# Patient Record
Sex: FEMALE | Race: WHITE | NOT HISPANIC OR LATINO | Employment: UNEMPLOYED | URBAN - METROPOLITAN AREA
[De-identification: names, ages, dates, MRNs, and addresses within clinical notes are randomized per-mention and may not be internally consistent; named-entity substitution may affect disease eponyms.]

---

## 2018-05-14 ENCOUNTER — HISTORICAL (OUTPATIENT)
Dept: RADIOLOGY | Facility: HOSPITAL | Age: 58
End: 2018-05-14

## 2018-05-22 ENCOUNTER — HISTORICAL (OUTPATIENT)
Dept: ENDOSCOPY | Facility: HOSPITAL | Age: 58
End: 2018-05-22

## 2019-03-12 LAB
ABS NEUT (OLG): 3.19 X10(3)/MCL (ref 2.1–9.2)
ALBUMIN SERPL-MCNC: 3.7 GM/DL (ref 3.4–5)
ALBUMIN/GLOB SERPL: 1.2 {RATIO}
ALP SERPL-CCNC: 73 UNIT/L (ref 38–126)
ALT SERPL-CCNC: 16 UNIT/L (ref 12–78)
AST SERPL-CCNC: 8 UNIT/L (ref 15–37)
BASOPHILS # BLD AUTO: 0 X10(3)/MCL (ref 0–0.2)
BASOPHILS NFR BLD AUTO: 0 %
BILIRUB SERPL-MCNC: 0.6 MG/DL (ref 0.2–1)
BILIRUBIN DIRECT+TOT PNL SERPL-MCNC: 0.1 MG/DL (ref 0–0.2)
BILIRUBIN DIRECT+TOT PNL SERPL-MCNC: 0.5 MG/DL (ref 0–0.8)
BUN SERPL-MCNC: 16 MG/DL (ref 7–18)
CALCIUM SERPL-MCNC: 9 MG/DL (ref 8.5–10.1)
CHLORIDE SERPL-SCNC: 102 MMOL/L (ref 98–107)
CHOLEST SERPL-MCNC: 261 MG/DL (ref 0–200)
CHOLEST/HDLC SERPL: 4 {RATIO} (ref 0–4)
CO2 SERPL-SCNC: 32 MMOL/L (ref 21–32)
CREAT SERPL-MCNC: 0.92 MG/DL (ref 0.55–1.02)
EOSINOPHIL # BLD AUTO: 0.1 X10(3)/MCL (ref 0–0.9)
EOSINOPHIL NFR BLD AUTO: 2 %
ERYTHROCYTE [DISTWIDTH] IN BLOOD BY AUTOMATED COUNT: 12.5 % (ref 11.5–17)
GLOBULIN SER-MCNC: 3.2 GM/DL (ref 2.4–3.5)
GLUCOSE SERPL-MCNC: 80 MG/DL (ref 74–106)
HCT VFR BLD AUTO: 42.2 % (ref 37–47)
HDLC SERPL-MCNC: 65 MG/DL (ref 35–60)
HGB BLD-MCNC: 13.8 GM/DL (ref 12–16)
LDLC SERPL CALC-MCNC: 180 MG/DL (ref 0–129)
LYMPHOCYTES # BLD AUTO: 1.8 X10(3)/MCL (ref 0.6–4.6)
LYMPHOCYTES NFR BLD AUTO: 31 %
MCH RBC QN AUTO: 32.2 PG (ref 27–31)
MCHC RBC AUTO-ENTMCNC: 32.7 GM/DL (ref 33–36)
MCV RBC AUTO: 98.4 FL (ref 80–94)
MONOCYTES # BLD AUTO: 0.4 X10(3)/MCL (ref 0.1–1.3)
MONOCYTES NFR BLD AUTO: 8 %
NEUTROPHILS # BLD AUTO: 3.19 X10(3)/MCL (ref 2.1–9.2)
NEUTROPHILS NFR BLD AUTO: 57 %
PLATELET # BLD AUTO: 247 X10(3)/MCL (ref 130–400)
PMV BLD AUTO: 9.5 FL (ref 9.4–12.4)
POTASSIUM SERPL-SCNC: 4.4 MMOL/L (ref 3.5–5.1)
PROT SERPL-MCNC: 6.9 GM/DL (ref 6.4–8.2)
RBC # BLD AUTO: 4.29 X10(6)/MCL (ref 4.2–5.4)
SODIUM SERPL-SCNC: 137 MMOL/L (ref 136–145)
TRIGL SERPL-MCNC: 78 MG/DL (ref 30–150)
TSH SERPL-ACNC: 0.84 MIU/L (ref 0.36–3.74)
VLDLC SERPL CALC-MCNC: 16 MG/DL
WBC # SPEC AUTO: 5.6 X10(3)/MCL (ref 4.5–11.5)

## 2019-04-03 ENCOUNTER — HOSPITAL ENCOUNTER (OUTPATIENT)
Dept: MEDSURG UNIT | Facility: HOSPITAL | Age: 59
End: 2019-04-05
Attending: SURGERY | Admitting: SURGERY

## 2019-04-03 LAB
ABS NEUT (OLG): 12.55 X10(3)/MCL (ref 2.1–9.2)
BASOPHILS # BLD AUTO: 0 X10(3)/MCL (ref 0–0.2)
BASOPHILS NFR BLD AUTO: 0 %
BUN SERPL-MCNC: 7 MG/DL (ref 7–18)
CALCIUM SERPL-MCNC: 8.4 MG/DL (ref 8.5–10.1)
CHLORIDE SERPL-SCNC: 105 MMOL/L (ref 98–107)
CO2 SERPL-SCNC: 26 MMOL/L (ref 21–32)
CREAT SERPL-MCNC: 1.04 MG/DL (ref 0.55–1.02)
CREAT/UREA NIT SERPL: 6.7
ERYTHROCYTE [DISTWIDTH] IN BLOOD BY AUTOMATED COUNT: 12.5 % (ref 11.5–17)
GLUCOSE SERPL-MCNC: 127 MG/DL (ref 74–106)
GROUP & RH: NORMAL
HCT VFR BLD AUTO: 43.4 % (ref 37–47)
HGB BLD-MCNC: 13.8 GM/DL (ref 12–16)
LYMPHOCYTES # BLD AUTO: 0.6 X10(3)/MCL (ref 0.6–4.6)
LYMPHOCYTES NFR BLD AUTO: 5 %
MCH RBC QN AUTO: 32.2 PG (ref 27–31)
MCHC RBC AUTO-ENTMCNC: 31.8 GM/DL (ref 33–36)
MCV RBC AUTO: 101.4 FL (ref 80–94)
MONOCYTES # BLD AUTO: 0.3 X10(3)/MCL (ref 0.1–1.3)
MONOCYTES NFR BLD AUTO: 2 %
NEUTROPHILS # BLD AUTO: 12.55 X10(3)/MCL (ref 2.1–9.2)
NEUTROPHILS NFR BLD AUTO: 92 %
PLATELET # BLD AUTO: 212 X10(3)/MCL (ref 130–400)
PMV BLD AUTO: 9.4 FL (ref 9.4–12.4)
POTASSIUM SERPL-SCNC: 3.9 MMOL/L (ref 3.5–5.1)
RBC # BLD AUTO: 4.28 X10(6)/MCL (ref 4.2–5.4)
SODIUM SERPL-SCNC: 140 MMOL/L (ref 136–145)
WBC # SPEC AUTO: 13.6 X10(3)/MCL (ref 4.5–11.5)

## 2019-04-04 LAB
ABS NEUT (OLG): 7.26 X10(3)/MCL (ref 2.1–9.2)
BASOPHILS # BLD AUTO: 0 X10(3)/MCL (ref 0–0.2)
BASOPHILS NFR BLD AUTO: 0 %
BUN SERPL-MCNC: 8 MG/DL (ref 7–18)
CALCIUM SERPL-MCNC: 8.5 MG/DL (ref 8.5–10.1)
CHLORIDE SERPL-SCNC: 106 MMOL/L (ref 98–107)
CO2 SERPL-SCNC: 30 MMOL/L (ref 21–32)
CREAT SERPL-MCNC: 0.88 MG/DL (ref 0.55–1.02)
CREAT/UREA NIT SERPL: 9.1
EOSINOPHIL # BLD AUTO: 0 X10(3)/MCL (ref 0–0.9)
EOSINOPHIL NFR BLD AUTO: 0 %
ERYTHROCYTE [DISTWIDTH] IN BLOOD BY AUTOMATED COUNT: 12.4 % (ref 11.5–17)
GLUCOSE SERPL-MCNC: 126 MG/DL (ref 74–106)
HCT VFR BLD AUTO: 37.5 % (ref 37–47)
HGB BLD-MCNC: 12.3 GM/DL (ref 12–16)
LYMPHOCYTES # BLD AUTO: 1.5 X10(3)/MCL (ref 0.6–4.6)
LYMPHOCYTES NFR BLD AUTO: 16 %
MCH RBC QN AUTO: 32.6 PG (ref 27–31)
MCHC RBC AUTO-ENTMCNC: 32.8 GM/DL (ref 33–36)
MCV RBC AUTO: 99.5 FL (ref 80–94)
MONOCYTES # BLD AUTO: 0.8 X10(3)/MCL (ref 0.1–1.3)
MONOCYTES NFR BLD AUTO: 8 %
NEUTROPHILS # BLD AUTO: 7.26 X10(3)/MCL (ref 2.1–9.2)
NEUTROPHILS NFR BLD AUTO: 76 %
PLATELET # BLD AUTO: 219 X10(3)/MCL (ref 130–400)
PMV BLD AUTO: 9.3 FL (ref 9.4–12.4)
POTASSIUM SERPL-SCNC: 3.9 MMOL/L (ref 3.5–5.1)
RBC # BLD AUTO: 3.77 X10(6)/MCL (ref 4.2–5.4)
SODIUM SERPL-SCNC: 140 MMOL/L (ref 136–145)
WBC # SPEC AUTO: 9.6 X10(3)/MCL (ref 4.5–11.5)

## 2019-11-20 ENCOUNTER — HISTORICAL (OUTPATIENT)
Dept: RADIOLOGY | Facility: HOSPITAL | Age: 59
End: 2019-11-20

## 2020-01-22 ENCOUNTER — HISTORICAL (OUTPATIENT)
Dept: RADIOLOGY | Facility: HOSPITAL | Age: 60
End: 2020-01-22

## 2020-05-28 ENCOUNTER — HISTORICAL (OUTPATIENT)
Dept: ADMINISTRATIVE | Facility: HOSPITAL | Age: 60
End: 2020-05-28

## 2020-06-03 ENCOUNTER — HISTORICAL (OUTPATIENT)
Dept: HEPATOLOGY | Facility: HOSPITAL | Age: 60
End: 2020-06-03

## 2020-06-11 LAB
ABS NEUT (OLG): 3.94 X10(3)/MCL (ref 2.1–9.2)
BASOPHILS # BLD AUTO: 0.03 X10(3)/MCL (ref 0–0.2)
BASOPHILS NFR BLD AUTO: 0.4 % (ref 0–1)
BUN SERPL-MCNC: 19.4 MG/DL (ref 9.8–20.1)
CALCIUM SERPL-MCNC: 9.9 MG/DL (ref 8.4–10.2)
CHLORIDE SERPL-SCNC: 105 MMOL/L (ref 98–107)
CO2 SERPL-SCNC: 29 MMOL/L (ref 23–31)
CREAT SERPL-MCNC: 0.81 MG/DL (ref 0.57–1.11)
CREAT/UREA NIT SERPL: 24
EOSINOPHIL # BLD AUTO: 0.15 X10(3)/MCL (ref 0–0.9)
EOSINOPHIL NFR BLD AUTO: 2.2 % (ref 0–6.4)
ERYTHROCYTE [DISTWIDTH] IN BLOOD BY AUTOMATED COUNT: 12.6 % (ref 11.5–17)
GLUCOSE SERPL-MCNC: 91 MG/DL (ref 82–115)
HCT VFR BLD AUTO: 42 % (ref 37–47)
HGB BLD-MCNC: 13.9 GM/DL (ref 12–16)
IMM GRANULOCYTES # BLD AUTO: 0.01 10*3/UL (ref 0–0.02)
IMM GRANULOCYTES NFR BLD AUTO: 0.1 % (ref 0–0.43)
LYMPHOCYTES # BLD AUTO: 2.23 X10(3)/MCL (ref 0.6–4.6)
LYMPHOCYTES NFR BLD AUTO: 32.6 % (ref 16–44)
MCH RBC QN AUTO: 31.9 PG (ref 27–31)
MCHC RBC AUTO-ENTMCNC: 33.1 GM/DL (ref 33–36)
MCV RBC AUTO: 96.3 FL (ref 80–94)
MONOCYTES # BLD AUTO: 0.48 X10(3)/MCL (ref 0.1–1.3)
MONOCYTES NFR BLD AUTO: 7 % (ref 4–12.1)
NEUTROPHILS # BLD AUTO: 3.94 X10(3)/MCL (ref 2.1–9.2)
NEUTROPHILS NFR BLD AUTO: 57.7 % (ref 43–73)
NRBC BLD AUTO-RTO: 0 % (ref 0–0.2)
PLATELET # BLD AUTO: 257 X10(3)/MCL (ref 130–400)
PMV BLD AUTO: 9.3 FL (ref 7.4–10.4)
POTASSIUM SERPL-SCNC: 3.7 MMOL/L (ref 3.5–5.1)
RBC # BLD AUTO: 4.36 X10(6)/MCL (ref 4.2–5.4)
SODIUM SERPL-SCNC: 141 MMOL/L (ref 136–145)
WBC # SPEC AUTO: 6.8 X10(3)/MCL (ref 4.5–11.5)

## 2020-06-19 ENCOUNTER — HISTORICAL (OUTPATIENT)
Dept: SURGERY | Facility: HOSPITAL | Age: 60
End: 2020-06-19

## 2020-08-17 ENCOUNTER — HISTORICAL (OUTPATIENT)
Dept: ADMINISTRATIVE | Facility: HOSPITAL | Age: 60
End: 2020-08-17

## 2020-10-26 ENCOUNTER — HISTORICAL (OUTPATIENT)
Dept: RADIOLOGY | Facility: HOSPITAL | Age: 60
End: 2020-10-26

## 2021-03-25 ENCOUNTER — HISTORICAL (OUTPATIENT)
Dept: LAB | Facility: HOSPITAL | Age: 61
End: 2021-03-25

## 2021-03-25 LAB
ABS NEUT (OLG): 2.59 X10(3)/MCL (ref 2.1–9.2)
ALBUMIN SERPL-MCNC: 3.8 GM/DL (ref 3.4–4.8)
ALBUMIN/GLOB SERPL: 1.6 RATIO (ref 1.1–2)
ALP SERPL-CCNC: 82 UNIT/L (ref 40–150)
ALT SERPL-CCNC: 19 UNIT/L (ref 0–55)
AST SERPL-CCNC: 24 UNIT/L (ref 5–34)
BASOPHILS # BLD AUTO: 0 X10(3)/MCL (ref 0–0.2)
BASOPHILS NFR BLD AUTO: 0 %
BILIRUB SERPL-MCNC: 0.7 MG/DL
BILIRUBIN DIRECT+TOT PNL SERPL-MCNC: 0.3 MG/DL (ref 0–0.5)
BILIRUBIN DIRECT+TOT PNL SERPL-MCNC: 0.4 MG/DL (ref 0–0.8)
BUN SERPL-MCNC: 8.4 MG/DL (ref 9.8–20.1)
CALCIUM SERPL-MCNC: 8.6 MG/DL (ref 8.4–10.2)
CHLORIDE SERPL-SCNC: 104 MMOL/L (ref 98–107)
CO2 SERPL-SCNC: 29 MMOL/L (ref 23–31)
CREAT SERPL-MCNC: 0.88 MG/DL (ref 0.55–1.02)
EOSINOPHIL # BLD AUTO: 0.2 X10(3)/MCL (ref 0–0.9)
EOSINOPHIL NFR BLD AUTO: 2 %
ERYTHROCYTE [DISTWIDTH] IN BLOOD BY AUTOMATED COUNT: 11.7 % (ref 11.5–17)
GLOBULIN SER-MCNC: 2.4 GM/DL (ref 2.4–3.5)
GLUCOSE SERPL-MCNC: 84 MG/DL (ref 82–115)
HCT VFR BLD AUTO: 42.2 % (ref 37–47)
HGB BLD-MCNC: 13.9 GM/DL (ref 12–16)
LYMPHOCYTES # BLD AUTO: 2.7 X10(3)/MCL (ref 0.6–4.6)
LYMPHOCYTES NFR BLD AUTO: 45 %
MCH RBC QN AUTO: 32 PG (ref 27–31)
MCHC RBC AUTO-ENTMCNC: 32.9 GM/DL (ref 33–36)
MCV RBC AUTO: 97 FL (ref 80–94)
MONOCYTES # BLD AUTO: 0.5 X10(3)/MCL (ref 0.1–1.3)
MONOCYTES NFR BLD AUTO: 8 %
NEUTROPHILS # BLD AUTO: 2.59 X10(3)/MCL (ref 1.4–7.9)
NEUTROPHILS NFR BLD AUTO: 44 %
PLATELET # BLD AUTO: 233 X10(3)/MCL (ref 130–400)
PMV BLD AUTO: 9.2 FL (ref 9.4–12.4)
POTASSIUM SERPL-SCNC: 3.7 MMOL/L (ref 3.5–5.1)
PROT SERPL-MCNC: 6.2 GM/DL (ref 5.8–7.6)
RBC # BLD AUTO: 4.35 X10(6)/MCL (ref 4.2–5.4)
SODIUM SERPL-SCNC: 143 MMOL/L (ref 136–145)
TSH SERPL-ACNC: 0.82 UIU/ML (ref 0.35–4.94)
WBC # SPEC AUTO: 5.9 X10(3)/MCL (ref 4.5–11.5)

## 2021-07-21 ENCOUNTER — HISTORICAL (OUTPATIENT)
Dept: RADIOLOGY | Facility: HOSPITAL | Age: 61
End: 2021-07-21

## 2021-11-12 ENCOUNTER — HISTORICAL (OUTPATIENT)
Dept: RADIOLOGY | Facility: HOSPITAL | Age: 61
End: 2021-11-12

## 2021-12-01 ENCOUNTER — HISTORICAL (OUTPATIENT)
Dept: ADMINISTRATIVE | Facility: HOSPITAL | Age: 61
End: 2021-12-01

## 2021-12-27 ENCOUNTER — HISTORICAL (OUTPATIENT)
Dept: RADIOLOGY | Facility: HOSPITAL | Age: 61
End: 2021-12-27

## 2022-04-10 ENCOUNTER — HISTORICAL (OUTPATIENT)
Dept: ADMINISTRATIVE | Facility: HOSPITAL | Age: 62
End: 2022-04-10
Payer: MEDICARE

## 2022-04-11 ENCOUNTER — HISTORICAL (OUTPATIENT)
Dept: ADMINISTRATIVE | Facility: HOSPITAL | Age: 62
End: 2022-04-11
Payer: MEDICARE

## 2022-04-20 ENCOUNTER — HISTORICAL (OUTPATIENT)
Dept: PREADMISSION TESTING | Facility: HOSPITAL | Age: 62
End: 2022-04-20
Payer: MEDICARE

## 2022-04-20 LAB
ABS NEUT (OLG): 2.23 (ref 2.1–9.2)
ALBUMIN SERPL-MCNC: 3.7 G/DL (ref 3.4–4.8)
ALBUMIN/GLOB SERPL: 1.4 {RATIO} (ref 1.1–2)
ALP SERPL-CCNC: 102 U/L (ref 40–150)
ALT SERPL-CCNC: 19 U/L (ref 0–55)
AST SERPL-CCNC: 25 U/L (ref 5–34)
BASOPHILS # BLD AUTO: 0 10*3/UL (ref 0–0.2)
BASOPHILS NFR BLD AUTO: 1 %
BILIRUB SERPL-MCNC: 0.7 MG/DL
BILIRUBIN DIRECT+TOT PNL SERPL-MCNC: 0.3 (ref 0–0.5)
BILIRUBIN DIRECT+TOT PNL SERPL-MCNC: 0.4 (ref 0–0.8)
BUN SERPL-MCNC: 15.4 MG/DL (ref 9.8–20.1)
CALCIUM SERPL-MCNC: 9.2 MG/DL (ref 8.7–10.5)
CHLORIDE SERPL-SCNC: 104 MMOL/L (ref 98–107)
CO2 SERPL-SCNC: 31 MMOL/L (ref 23–31)
CREAT SERPL-MCNC: 0.96 MG/DL (ref 0.55–1.02)
DEPRECATED CALCIDIOL+CALCIFEROL SERPL-MC: 12.8 NG/ML (ref 30–80)
EOSINOPHIL # BLD AUTO: 0.2 10*3/UL (ref 0–0.9)
EOSINOPHIL NFR BLD AUTO: 4 %
ERYTHROCYTE [DISTWIDTH] IN BLOOD BY AUTOMATED COUNT: 12.5 % (ref 11.5–17)
GLOBULIN SER-MCNC: 2.6 G/DL (ref 2.4–3.5)
GLUCOSE SERPL-MCNC: 78 MG/DL (ref 82–115)
HCT VFR BLD AUTO: 43.3 % (ref 37–47)
HEMOLYSIS INTERF INDEX SERPL-ACNC: 7
HGB BLD-MCNC: 13.9 G/DL (ref 12–16)
ICTERIC INTERF INDEX SERPL-ACNC: 1
LIPEMIC INTERF INDEX SERPL-ACNC: <0
LYMPHOCYTES # BLD AUTO: 1.5 10*3/UL (ref 0.6–4.6)
LYMPHOCYTES NFR BLD AUTO: 35 %
MANUAL DIFF? (OHS): NO
MCH RBC QN AUTO: 31.9 PG (ref 27–31)
MCHC RBC AUTO-ENTMCNC: 32.1 G/DL (ref 33–36)
MCV RBC AUTO: 99.3 FL (ref 80–94)
MONOCYTES # BLD AUTO: 0.4 10*3/UL (ref 0.1–1.3)
MONOCYTES NFR BLD AUTO: 10 %
NEUTROPHILS # BLD AUTO: 2.23 10*3/UL (ref 2.1–9.2)
NEUTROPHILS NFR BLD AUTO: 51 %
PLATELET # BLD AUTO: 249 10*3/UL (ref 130–400)
PMV BLD AUTO: 10 FL (ref 9.4–12.4)
POTASSIUM SERPL-SCNC: 4.6 MMOL/L (ref 3.5–5.1)
PROT SERPL-MCNC: 6.3 G/DL (ref 5.8–7.6)
RBC # BLD AUTO: 4.36 10*6/UL (ref 4.2–5.4)
SODIUM SERPL-SCNC: 140 MMOL/L (ref 136–145)
TSH SERPL-ACNC: 4.98 M[IU]/L (ref 0.35–4.94)
WBC # SPEC AUTO: 4.4 10*3/UL (ref 4.5–11.5)

## 2022-04-26 ENCOUNTER — HOSPITAL ENCOUNTER (OUTPATIENT)
Dept: SURGERY | Facility: HOSPITAL | Age: 62
End: 2022-04-27
Attending: OBSTETRICS & GYNECOLOGY | Admitting: OBSTETRICS & GYNECOLOGY

## 2022-04-27 LAB
HCT VFR BLD AUTO: 38.6 % (ref 37–47)
HGB BLD-MCNC: 12.4 G/DL (ref 12–16)

## 2022-04-28 VITALS
WEIGHT: 174 LBS | SYSTOLIC BLOOD PRESSURE: 121 MMHG | DIASTOLIC BLOOD PRESSURE: 77 MMHG | HEIGHT: 65 IN | BODY MASS INDEX: 28.99 KG/M2

## 2022-04-28 VITALS
SYSTOLIC BLOOD PRESSURE: 121 MMHG | WEIGHT: 174 LBS | DIASTOLIC BLOOD PRESSURE: 77 MMHG | HEIGHT: 65 IN | BODY MASS INDEX: 28.99 KG/M2

## 2022-04-30 NOTE — OP NOTE
DATE OF SURGERY:    06/19/2020    SURGEON:  Sanjiv Hendrickson MD    SERVICE:  Orthopedics.    PREOPERATIVE DIAGNOSES:    1. Right shoulder rotator cuff tear.    2. Bicipital tendon tearing.  3. Subacromial impingement.  4. SLAP tear.    POSTOPERATIVE DIAGNOSES:    1. Right shoulder rotator cuff tear.    2. Bicipital tendon tearing.  3. Subacromial impingement.  4. SLAP tear.    PROCEDURES PERFORMED:    1. Right shoulder arthroscopy with debridement of anterior superior labrum anterior and posterior tear.  2. Mini open rotator cuff repair, full thickness and retracted.  3. Mini open right shoulder biceps tenodesis.  4. Right shoulder mini open subacromial decompression.    ANESTHESIA:  LMA.    IV FLUIDS:  As per Anesthesia.    ESTIMATED BLOOD LOSS:  Less than 10 cc.    COUNTS:  Correct.    COMPLICATIONS:  None.    IMPLANTS:  Arthrex suture anchors x2.    DISPOSITION:  Stable to PACU.    INDICATIONS FOR THE PROCEDURE:  Ms Beck is a 60-year-old female with continued pain and loss of motion of her right shoulder and arm.  She has failed multiple conservative treatments.  She has been followed in my clinic.  She had an MRI demonstrating the above findings.  The risks, benefits, and alternatives were discussed with the patient in detail.  All questions were answered.  Informed consent was obtained.    OPERATIVE NOTE IN DETAIL:  The patient was found in preoperative holding by Anesthesia and found fit for surgery.  She was taken to the operating room and placed on the operating table in supine position.  All bony prominences were well padded.  Time-out was called to identify correct patient, correct procedure, correct site, and all were in agreement.  The patient underwent LMA anesthesia without complications.  She was then prepped and draped in usual sterile fashion, leaving the right upper extremity exposed for surgery.  She was in the modified beach chair position.  She received her preoperative antibiotics.  Next,  through the posterior portal with good backflow, a 1 cm incision was made.  The camera was introduced into the glenohumeral joint.  After this was done, the anterior portal was then made through 1 cm incision just lateral to the tip of the coracoid.  Next, examination of the glenohumeral joint demonstrated some minimal degenerative changes.  She had biceps tendon tearing, intra-articular, as well as complete subluxation medially.  She had a large subscap retracted rotator cuff tear as well as a supraspinatus rotator cuff tear.  It was full thickness and retracted to about the mid humeral line.  With the use of a 3.5 shaver, the patient underwent debridement of the anterior labral tear as well.  The inferior recess was inspected and found no loose bodies.  The posterior labrum was intact.  Given the degenerative changes of the biceps tendon as well as medial subluxation, a 3.5 cm anterolateral incision was made over the right shoulder.  Soft tissue dissection was taken down to the fascia, where it was split in line with its fibers, careful not to go greater than 3 cm past the tip of the acromion.  Next, the subacromial space was identified.  A large amount of bursal tissue was removed.  She had scraping of the undersurface of the acromion.  After subacromial decompression, attention was turned to the biceps first, where it was then tagged and cut.  It was then placed into the intra-articular groove.  The patient had a biceps tenodesis with the Arthrex suture anchor.  Care was taken not to over or under tension the biceps tendon.  After this was done, attention was turned to the full-thickness supraspinatus rotator cuff tear.  With the use of an Arthrex suture anchor, it was then repaired back down to the insertion area of the footprint.  She had good coverage over the humeral head.  Attention was turned to the subscap, where it was completely retracted.  It was unable to be brought back over to the insertion area.   After this was done, the camera was introduced back into the glenohumeral joint where the remaining stump was removed with a 3.5 shaver.  After this was done, copious irrigation was used to wash the incisions.  The incisions were then closed.  The fascia was closed with 0 Vicryl.  The subcutaneous tissue was closed with 2-0 Vicryl.  The skin was closed with 3-0 Monocryl sutures.  Mastisol, Steri-Strips, Xeroform, 4 x 4's, soft tissue dressing, and a shoulder abduction     sling were placed to the right upper extremity.  She was then awoken by Anesthesia and brought to PACU in stable condition.        ______________________________  MD ARIK Romero/SK  DD:  06/23/2020  Time:  12:54PM  DT:  06/23/2020  Time:  02:02PM  Job #:  [id number]

## 2022-04-30 NOTE — OP NOTE
Patient:   Teri Beck            MRN: [id number]            FIN: [id number]               Age:   58 years     Sex:  Female     :  1960   Associated Diagnoses:   None   Author:   Afshin Navarro MD      procedure: esophagal Manometry  Physician: Dr. Hans Navarro    Indication: gerd, hiatal hernia, epigastric pain    Findings: 10 wet swallows were performed.   The LES pressure was normal.  There were a few failed swallows.  There were a few swallows with weak peristalsis.  DCI was normal.      Impression:   1. LArge hiatal hernia  2.  Frequent failed peristalsis  3.  a few weak swallows    Plan:    1.  pt should be ok for nissen repair.  Would not perform tight Nissen Wrap if possible.

## 2022-04-30 NOTE — OP NOTE
DATE OF SURGERY:    04/03/2019    SURGEON:  Neo Colón IV, MD    PREOPERATIVE DIAGNOSES:    1. Large incarcerated hiatal hernia with colon and stomach incarcerated within a large mediastinal sac with organoaxial rotation, volvulus of the stomach.  2. Morbid obesity with a body mass index of 40 at 240 pounds with interval weight loss of approximately 45 pounds.  3. Obstructive sleep apnea, hypercholesterolemia, depression, dysmobility, chronic fatigue, fibromyalgia, deconditioned state.    PROCEDURE PERFORMED:    1. Laparoscopic Reduction of incarcerated hiatal hernia (colon and stomach).    2. Laparoscopic Esophageal myotomy for stricture at 35 cm from the incisors.  3. Laparoscopic Nikhil gastroplasty, transabdominal.  4. Laparoscopic Partial gastrectomy.  5. Laparoscopic Nissen fundoplication.  6. Laparoscopic Phasix mesh repair of hiatal hernia.  7. Laparoscopic Gastropexy.    POSTOPERATIVE DIAGNOSES:    1. Large incarcerated hiatal hernia with colon and stomach incarcerated within a large mediastinal sac with organoaxial rotation, volvulus of the stomach.  2. Morbid obesity with a body mass index of 40 at 240 pounds with interval weight loss of approximately 45 pounds.  3. Obstructive sleep apnea, hypercholesterolemia, depression, dysmobility, chronic fatigue, fibromyalgia, deconditioned state.    4. Dense esophageal stricture at 35 cm.    NOTATION:  Fundoplication was made over a 40-[place] bougie as a larger bougie would not pass the stricture even after it was lysed.    DETAILS OF PROCEDURE:  After proper informed consent was obtained, the patient was transported to the operating room where she was placed supine on the operating room table.  After an adequate level of general endotracheal anesthesia was achieved, the patient's abdomen was prepped and draped in standard sterile surgical fashion.       The operation commenced with infiltration of local anesthetic in the supraumbilical position, followed  by Good open entry into the abdominal cavity, without complication by elevating the base of the umbilical ligament and opening the fascia with a scalpel and then dilating with a hemostatic, identifying the peritoneum which was then opened bluntly with a hemostat, which allowed placement of an 11 mm blunt tip trocar.  The trocar was inserted and secured.  Abdominal insufflation was undertaken to 15 mmHg pressure without significant hemodynamic change.  The camera was inserted.  The abdominal contents were inspected and photographed.  No masses, lesions, or pathology were immediately evident.  After inspection of the abdominal cavity, there was no complication from abdominal entry.       The patient was placed in reverse Trendelenburg position.  Following infiltration of local anesthetic and through a minor stab incision under direct visualization, a 5 mm trocar was placed in the right flank just beneath the 10th rib margin to allow placement of a snake retractor.  Utilizing this retractor held in place with mechanical aid, the left lobe of the liver was elevated.  A large hiatal hernia was just beginning to be obvious at this point.  The colon could be seen clearly passing into the hernia.  Subsequently, following infiltration of local anesthetic and through a minor stab incision, 1 epigastric 5 mm trocar and 1 epigastric 11 mm trocar were placed.  These were placed higher than their usual position due to the patient's obesity which was central in nature with a very high diaphragm and a significant distance from the umbilicus with the camera not quite reaching the esophageal hiatus, despite it being placed to the hub.  Subsequently, the entirety of the colon was reduced with only having to lyse minor adhesions.  The stomach was densely adherent into the mediastinum.  There was a huge mediastinal hernia sac.  Subsequently, the lesser omentum was taken down laterally.  It was densely adherent to the right miky of the  diaphragm.  The greater omentum was equally as dense and fixed laterally.       The short gastric vessels were taken down from at the midpoint of the stomach cephalad.  The stomach itself, including the cardia especially, was densely adherent to the upper portion of the hernia sac, and most of our dissection freeing the stomach had to be done within the mediastinum itself.  With the stomach cleared anteriorly and posteriorly, the esophagus was densely fixed to the upper mediastinum.  Attempts at passing a 54-[place] bougie were unsuccessful.  We then downsized from a 52-[place] to a 40-[place] Korean, and even the 40-[place] would not pass approximately 35 cm.  I was able to identify the area of narrowing where the bougie was meeting resistance.  At this point, there was dense white stricturing of the esophagus.  Outer longitudinal and inner circular layer were lysed, resulting in release of the stricture and ability to pass the 40-[place] bougie freely into the stomach.  There was only 1 inner muscular layer still present on the esophagus with the mucosa notably completely intact.       With the stomach under tension, the GE junction would only just barely reach the hiatus, and it was clear that without an esophageal lengthening procedure, we could not get intraabdominal placement of the wrap.  Subsequently, a high right upper quadrant 12 mm trocar was placed, across which the stapler was placed.  The 60 mm stapler was fired once transversely approximately 4 cm below the cardia and a 2nd time vertically along the esophagus, lengthening the esophagus by at least 5 cm.  The resected portion of stomach was then removed from the surgical field.  The apex of the new cardia was then brought posterior to the stomach, and a very loose wrap was performed over the 40-[place] bougie at the level of the new GE junction, affixing the wrap with 2-0 Ethibond suture at the superior and inferior aspect of the wrap to the stomach.  Approximately  2.5 cm wrap was created.  The wrap remained in the abdomen without tension.  Consideration for closing the hiatus directly were abandoned as it was wide and patulous, and there was no give whatsoever, even under reduced intraabdominal tension.  Subsequently, the defect was measured at approximately 4.5 x 5 cm and a piece of Phasix ST mesh 7 x 10 cm was trimmed in a keyhole fashion to fit along the crura and then close the hiatus.  It was sewn in place circumferentially with Ethibond sutures, resulting in a satisfactory repair.  The wrap was then pexed to the left miky of the diaphragm at the 3 o'clock position.     Copious irrigation of the abdomen was followed by aspiration and then placement of Evicel along the visible staple lines.  Liver retractor was removed.  The abdomen was inspected.  The abdominal contents were returned to a normal cascading contour.  The operation proved technically very difficult, but the ultimate result was more than satisfactory.  Trocars removed under direct visualization.  Abdomen was desufflated.  The wounds were then closed with a combination of 3-0 Vicryl and 4-0 Monocryl subcuticular closure.  All sponge, needle, and instrument counts were correct at the end of the case.  There were no complications.        ______________________________  MD JEANNE Billingsley IVK/SR  DD:  04/03/2019  Time:  03:07PM  DT:  04/03/2019  Time:  03:34PM  Job #:  [id number]    cc: MD Ovi Whittaker MD

## 2022-05-14 NOTE — OP NOTE
Patient:   Teri Beck            MRN: [id number]            FIN: [id number]               Age:   61 years     Sex:  Female     :  1960   Associated Diagnoses:   None   Author:   Sarah Clifton MD      Pre-op Diagnosis:    1.  Left adnexal mass  2.  Postmenopausal  3.  Thickened endometrial stripe    Postop Diagnosis:   Same    Procedure:  1. Total laparoscopic hysterectomy  2. Bilateral salpingo-oophorectomy    Surgeon: Sarah Clifton MD  Assistant:  Jelly Blanton MD  Anesthesia: General  Complications: None  EBL: 50cc  Urine Output: 150cc  Intravenous Fluid: 1500cc  Specimen: Uterus, cervix, bilateral fallopian tubes and ovaries.     Findings: Exam under anesthesia revealed normal external genitalia and normal appearing cervix.  Uterus 7 weeks size and mobile.  Laparoscopy revealed normal appearing uterus, bilateral fallopian tubes and right ovary.  Left ovary with multilobular mass with filmy adhesions to the posterior side wall.  Smooth appearing capsule.  Normal appearing liver edge and stomach edge.  Normal omentum.  Normal fluid in cul-de-sac.     Indication and Consent: 61 year old female with left adnexal mass.  Normal [place] panel.  Incidental finding of thickened endometrium with normal preop biopsy.  Risks, benefits and options with her.  She desired definitive surgical management with a hysterectomy and bilateral salpingo-oophorectomy. The patient stated understanding and desired to proceed.  All questions were answered.     Procedure: The patient was taken to the operating room with IV fluids running and SCDs applied to the lower extremities. She received preoperative antibiotic prophylaxis with Ancef. General anesthesia was obtained without difficulty. She was prepped and draped in the dorsal supine position in Carlos type stirrups.  Her arms were securely tucked.   A speculum was placed into the vagina. The anterior lip of the cervix was then grasped with the tenaculum and the  uterus was sounded to 7cm.  The MARLON uterine manipulator with Koh colpotomy ring was then placed securely.  A Royal catheter was placed in the bladder.     A small vertical incision was made superior to the umbilicus. The Veress needle was introduced into the peritoneal cavity at a straight angle without difficulty. The pneumoperitoneum was established with CO2 gas to a pressure of 15mm Hg.  An 11 mm trocar was inserted into the abdomen.  Intraabdominal placement confirmed with the laparoscope.  Findings as above.  Two 5mm trocars were placed in bilateral lower quadrants under direct laparoscopic visualization.  Trendelenburg position was obtained to facilitate moving bowel and omentum from the pelvis.     The uterus was elevated from the pelvis with the manipulator. The round ligament was clamped, coagulated and transected using the Harmonic scalpel.  The vesicouterine peritoneum was incised with the Harmonic and the bladder dissected off the lower uterine segment.  The uterus was gently retracted to the opposite side.  The ureter was easily identified on the pelvic sidewall.  The IP ligament was coagulated first with the Lisa the clamped and transected with the Harmonic.  The broad ligament was sequentially transected until the uterine arteries were identified.  The Harmonic scalpel was used to dissect through adhesions.  The Lisa was used to coagulate the vessels then the Harmonic used to clamp and transect. The opposite side was done in the same fashion. With the MARLON pushing the uterus into the pelvis, the cervicovaginal junction was delineated by the colpotomy ring.  The vagina was entered and incised circumferentially using the active blade of the Harmonic.  The uterus, tubes and ovaries were then completely  and removed thru the vagina.  The pelvis and all pedicles were irrigated and noted to be hemostatic.  The vaginal cuff was closed laparoscopically with barbed, Stratafix suture in a running  fashion.  She was taken out of Trendelenburg the pneumoperitoneum was slowly released and pedicles again inspected.  Hemostasis confirmed.   The ureters were again visualized and seen peristalsing briskly, inferior to the operative field.  All instruments removed from the abdomen.  Counts were correct.  The skin incisions were closed in a subcuticular fashion.

## 2022-05-14 NOTE — DISCHARGE SUMMARY
Patient:   Teri Beck            MRN: [id number]            FIN: [id number]               Age:   61 years     Sex:  Female     :  1960   Associated Diagnoses:   None   Author:   Jelly Blanton MD      Discharge Summary s/p TLH    Admit Date : 22  Discharge Date: 22    Ms Rees was admitted  to OR for TLH/BSO.  Procedure was without complications.  She was admitted to the post operative floor where she did well postoperatively.  On POD 1 she was abulating, voiding, and tolerating a regular diet without difficulty.  She was discharged home with postop precautions and scheduled folowup.

## 2022-06-24 DIAGNOSIS — G47.19 EXCESSIVE DAYTIME SLEEPINESS: ICD-10-CM

## 2022-06-24 DIAGNOSIS — G47.33 OBSTRUCTIVE SLEEP APNEA (ADULT) (PEDIATRIC): Primary | ICD-10-CM

## 2022-06-24 RX ORDER — DEXTROAMPHETAMINE SACCHARATE, AMPHETAMINE ASPARTATE, DEXTROAMPHETAMINE SULFATE AND AMPHETAMINE SULFATE 5; 5; 5; 5 MG/1; MG/1; MG/1; MG/1
1 TABLET ORAL 2 TIMES DAILY
Qty: 180 TABLET | Refills: 0 | Status: SHIPPED | OUTPATIENT
Start: 2022-06-24 | End: 2022-09-15 | Stop reason: SDUPTHER

## 2022-06-24 RX ORDER — DEXTROAMPHETAMINE SACCHARATE, AMPHETAMINE ASPARTATE, DEXTROAMPHETAMINE SULFATE AND AMPHETAMINE SULFATE 5; 5; 5; 5 MG/1; MG/1; MG/1; MG/1
1 TABLET ORAL 2 TIMES DAILY
COMMUNITY
Start: 2022-02-22 | End: 2022-06-24 | Stop reason: SDUPTHER

## 2022-08-04 RX ORDER — TRAMADOL HYDROCHLORIDE 50 MG/1
50 TABLET ORAL 2 TIMES DAILY PRN
COMMUNITY
Start: 2022-04-12

## 2022-08-04 RX ORDER — OXYCODONE AND ACETAMINOPHEN 5; 325 MG/1; MG/1
1 TABLET ORAL EVERY 6 HOURS PRN
COMMUNITY
Start: 2022-04-25 | End: 2022-09-15

## 2022-08-04 RX ORDER — CITALOPRAM 20 MG/1
40 TABLET, FILM COATED ORAL DAILY
COMMUNITY
Start: 2022-03-01 | End: 2023-02-14

## 2022-08-04 RX ORDER — LEVOTHYROXINE SODIUM 75 UG/1
75 TABLET ORAL DAILY
COMMUNITY
Start: 2022-04-29

## 2022-08-04 RX ORDER — ROSUVASTATIN CALCIUM 20 MG/1
20 TABLET, COATED ORAL NIGHTLY
COMMUNITY
Start: 2022-03-01

## 2022-08-04 RX ORDER — METOCLOPRAMIDE 5 MG/1
5 TABLET ORAL 3 TIMES DAILY PRN
COMMUNITY
Start: 2022-05-28

## 2022-09-15 ENCOUNTER — OFFICE VISIT (OUTPATIENT)
Dept: NEUROLOGY | Facility: CLINIC | Age: 62
End: 2022-09-15
Payer: MEDICARE

## 2022-09-15 VITALS
SYSTOLIC BLOOD PRESSURE: 133 MMHG | BODY MASS INDEX: 29.53 KG/M2 | HEIGHT: 64 IN | WEIGHT: 173 LBS | DIASTOLIC BLOOD PRESSURE: 80 MMHG

## 2022-09-15 DIAGNOSIS — G47.33 OBSTRUCTIVE SLEEP APNEA (ADULT) (PEDIATRIC): ICD-10-CM

## 2022-09-15 DIAGNOSIS — G47.33 OSA ON CPAP: Primary | ICD-10-CM

## 2022-09-15 DIAGNOSIS — G47.00 INSOMNIA, UNSPECIFIED TYPE: ICD-10-CM

## 2022-09-15 DIAGNOSIS — G47.19 EXCESSIVE DAYTIME SLEEPINESS: ICD-10-CM

## 2022-09-15 PROCEDURE — 1160F RVW MEDS BY RX/DR IN RCRD: CPT | Mod: CPTII,S$GLB,, | Performed by: NURSE PRACTITIONER

## 2022-09-15 PROCEDURE — 99214 OFFICE O/P EST MOD 30 MIN: CPT | Mod: S$GLB,,, | Performed by: NURSE PRACTITIONER

## 2022-09-15 PROCEDURE — 3079F PR MOST RECENT DIASTOLIC BLOOD PRESSURE 80-89 MM HG: ICD-10-PCS | Mod: CPTII,S$GLB,, | Performed by: NURSE PRACTITIONER

## 2022-09-15 PROCEDURE — 99999 PR PBB SHADOW E&M-EST. PATIENT-LVL III: ICD-10-PCS | Mod: PBBFAC,,, | Performed by: NURSE PRACTITIONER

## 2022-09-15 PROCEDURE — 3008F PR BODY MASS INDEX (BMI) DOCUMENTED: ICD-10-PCS | Mod: CPTII,S$GLB,, | Performed by: NURSE PRACTITIONER

## 2022-09-15 PROCEDURE — 1159F MED LIST DOCD IN RCRD: CPT | Mod: CPTII,S$GLB,, | Performed by: NURSE PRACTITIONER

## 2022-09-15 PROCEDURE — 1159F PR MEDICATION LIST DOCUMENTED IN MEDICAL RECORD: ICD-10-PCS | Mod: CPTII,S$GLB,, | Performed by: NURSE PRACTITIONER

## 2022-09-15 PROCEDURE — 1160F PR REVIEW ALL MEDS BY PRESCRIBER/CLIN PHARMACIST DOCUMENTED: ICD-10-PCS | Mod: CPTII,S$GLB,, | Performed by: NURSE PRACTITIONER

## 2022-09-15 PROCEDURE — 3075F PR MOST RECENT SYSTOLIC BLOOD PRESS GE 130-139MM HG: ICD-10-PCS | Mod: CPTII,S$GLB,, | Performed by: NURSE PRACTITIONER

## 2022-09-15 PROCEDURE — 3075F SYST BP GE 130 - 139MM HG: CPT | Mod: CPTII,S$GLB,, | Performed by: NURSE PRACTITIONER

## 2022-09-15 PROCEDURE — 99999 PR PBB SHADOW E&M-EST. PATIENT-LVL III: CPT | Mod: PBBFAC,,, | Performed by: NURSE PRACTITIONER

## 2022-09-15 PROCEDURE — 3008F BODY MASS INDEX DOCD: CPT | Mod: CPTII,S$GLB,, | Performed by: NURSE PRACTITIONER

## 2022-09-15 PROCEDURE — 99214 PR OFFICE/OUTPT VISIT, EST, LEVL IV, 30-39 MIN: ICD-10-PCS | Mod: S$GLB,,, | Performed by: NURSE PRACTITIONER

## 2022-09-15 PROCEDURE — 3079F DIAST BP 80-89 MM HG: CPT | Mod: CPTII,S$GLB,, | Performed by: NURSE PRACTITIONER

## 2022-09-15 RX ORDER — TRAZODONE HYDROCHLORIDE 50 MG/1
50 TABLET ORAL NIGHTLY PRN
COMMUNITY

## 2022-09-15 RX ORDER — DOCUSATE SODIUM 100 MG/1
100 CAPSULE, LIQUID FILLED ORAL 2 TIMES DAILY PRN
COMMUNITY

## 2022-09-15 RX ORDER — AMOXICILLIN 250 MG
1 CAPSULE ORAL DAILY
COMMUNITY

## 2022-09-15 RX ORDER — DEXTROAMPHETAMINE SACCHARATE, AMPHETAMINE ASPARTATE, DEXTROAMPHETAMINE SULFATE AND AMPHETAMINE SULFATE 5; 5; 5; 5 MG/1; MG/1; MG/1; MG/1
1 TABLET ORAL 2 TIMES DAILY
Qty: 180 TABLET | Refills: 0 | Status: SHIPPED | OUTPATIENT
Start: 2022-09-23 | End: 2022-12-15 | Stop reason: SDUPTHER

## 2022-09-15 NOTE — ASSESSMENT & PLAN NOTE
Encouraged continued use of PAP. Drowsy driving may still occur despite PAP use. Clinical follow up and replacement of supplies discussed.    DME:[facility] in 1 year

## 2022-09-15 NOTE — PROGRESS NOTES
Subjective:           Patient ID: Teri Beck is a 62 y.o. female.    Chief Complaint: Sleep Apnea     HPI:              Cont to feel PAP therapy is beneficial; feels refreshed when awakening  Denies issues with mask/machine    States excessive daytime tiredness is adequately managed w/Adderall 20 mg BID; rarely takes a nap    Takes Trazodone 50 mg tab, 1/4 - 1/2 tab at bed time for insomnia which continues to be efficacious.     PAP data:  date range 08/16/2022-09/14/2022  days used >=4hr 27/30  90%  CPAP 14 cm    EPWORTH SLEEPINESS SCALE 9/15/2022   Sitting and reading 1   Watching TV 0   Sitting, inactive in a public place (e.g. a theatre or a meeting) 0   As a passenger in a car for an hour without a break 0   Lying down to rest in the afternoon when circumstances permit 2   Sitting and talking to someone 0   Sitting quietly after a lunch without alcohol 0   In a car, while stopped for a few minutes in traffic 0   Total score 3     Tramadol PRN for Fibromyalgia continues to be efficacious       04/2022 hysterectomy  07/2022 hernia repair    ROS: as per HPI, otherwise pertinent systems review is negative          Past Medical History:   Diagnosis Date    Depression     Fibromyalgia     Gastroparesis     GERD (gastroesophageal reflux disease)     Hypercholesteremia     Hypothyroid     Insomnia        Past Surgical History:   Procedure Laterality Date    HERNIA REPAIR  06/2022    HYSTERECTOMY      ROTATOR CUFF REPAIR         Family History   Problem Relation Age of Onset    Dementia Mother     Stroke Mother     Cancer Mother     Dementia Father     Diabetes Father     Diabetes Sister     Diabetes Brother        Social History     Socioeconomic History    Marital status:    Tobacco Use    Smoking status: Never    Smokeless tobacco: Never   Substance and Sexual Activity    Alcohol use: Yes    Drug use: Never       Review of patient's allergies indicates:  No Known Allergies      Current Outpatient  "Medications:     citalopram (CELEXA) 20 MG tablet, Take by mouth., Disp: , Rfl:     docusate sodium (COLACE) 100 MG capsule, Take 100 mg by mouth 2 (two) times daily., Disp: , Rfl:     levothyroxine (SYNTHROID) 75 MCG tablet, Take 75 mcg by mouth once daily., Disp: , Rfl:     metoclopramide HCl (REGLAN) 5 MG tablet, Take 5 mg by mouth 3 (three) times daily., Disp: , Rfl:     rosuvastatin (CRESTOR) 20 MG tablet, Take 20 mg by mouth nightly., Disp: , Rfl:     senna-docusate 8.6-50 mg (SENNA WITH DOCUSATE SODIUM) 8.6-50 mg per tablet, Take 1 tablet by mouth once daily., Disp: , Rfl:     traMADoL (ULTRAM) 50 mg tablet, Take 50 mg by mouth 2 (two) times daily as needed., Disp: , Rfl:     traZODone (DESYREL) 50 MG tablet, Take 50 mg by mouth nightly as needed., Disp: , Rfl:     [START ON 9/23/2022] dextroamphetamine-amphetamine (ADDERALL) 20 mg tablet, Take 1 tablet by mouth 2 (two) times daily., Disp: 180 tablet, Rfl: 0         Objective:      Exam:   /80 (BP Location: Left arm, Patient Position: Sitting)   Ht 5' 4" (1.626 m)   Wt 78.5 kg (173 lb)   BMI 29.70 kg/m²     Physical Exam  Vitals reviewed.   Constitutional:      Appearance: Normal appearance.      Accompanied by: alone  HENT:      Ears:      Comments: Hearing normal.  Eyes:      Extraocular Movements: Extraocular movements intact.   Cardiovascular:      Rate and Rhythm: Normal rate and regular rhythm.   Pulmonary:      Effort: Pulmonary effort is normal.      Breath sounds: Normal breath sounds.   Musculoskeletal:         General: Normal range of motion.   Skin:     General: Skin is warm and dry.   Neurological:      General: No focal deficit present.      Mental Status: He is alert and oriented to person, place, and time.      Gait unassisted and normal.  Psychiatric:         Mood and Affect: Mood normal.         Behavior: Behavior normal.     Has a gastric button s/p GI surgery with Dr. Colón for hernia repair and gastric anchoring.      "   Assessment/Plan:     Problem List Items Addressed This Visit          Other    KEVEN on CPAP - Primary    Current Assessment & Plan     Encouraged continued use of PAP. Drowsy driving may still occur despite PAP use. Clinical follow up and replacement of supplies discussed.    DME:[facility]    FU in 1 year            Excessive daytime sleepiness    Current Assessment & Plan     Continue Adderall 20 mg tab, 1 tab in the morning and 1 tab at noon    FU in 3 months          Insomnia    Current Assessment & Plan     Continue Trazodone 25-50 mg at bed time as needed          Other Visit Diagnoses       Obstructive sleep apnea (adult) (pediatric)                  Gigi Cummins, MSN, APRN, AGACNP-BC

## 2022-12-15 ENCOUNTER — OFFICE VISIT (OUTPATIENT)
Dept: NEUROLOGY | Facility: CLINIC | Age: 62
End: 2022-12-15
Payer: MEDICARE

## 2022-12-15 DIAGNOSIS — G47.00 INSOMNIA, UNSPECIFIED TYPE: ICD-10-CM

## 2022-12-15 DIAGNOSIS — G47.33 OSA ON CPAP: Primary | ICD-10-CM

## 2022-12-15 DIAGNOSIS — G47.33 OBSTRUCTIVE SLEEP APNEA (ADULT) (PEDIATRIC): ICD-10-CM

## 2022-12-15 DIAGNOSIS — G47.19 EXCESSIVE DAYTIME SLEEPINESS: ICD-10-CM

## 2022-12-15 PROCEDURE — 1160F PR REVIEW ALL MEDS BY PRESCRIBER/CLIN PHARMACIST DOCUMENTED: ICD-10-PCS | Mod: CPTII,95,, | Performed by: NURSE PRACTITIONER

## 2022-12-15 PROCEDURE — 1160F RVW MEDS BY RX/DR IN RCRD: CPT | Mod: CPTII,95,, | Performed by: NURSE PRACTITIONER

## 2022-12-15 PROCEDURE — 99213 PR OFFICE/OUTPT VISIT, EST, LEVL III, 20-29 MIN: ICD-10-PCS | Mod: 95,,, | Performed by: NURSE PRACTITIONER

## 2022-12-15 PROCEDURE — 1159F PR MEDICATION LIST DOCUMENTED IN MEDICAL RECORD: ICD-10-PCS | Mod: CPTII,95,, | Performed by: NURSE PRACTITIONER

## 2022-12-15 PROCEDURE — 99213 OFFICE O/P EST LOW 20 MIN: CPT | Mod: 95,,, | Performed by: NURSE PRACTITIONER

## 2022-12-15 PROCEDURE — 1159F MED LIST DOCD IN RCRD: CPT | Mod: CPTII,95,, | Performed by: NURSE PRACTITIONER

## 2022-12-15 RX ORDER — DEXTROAMPHETAMINE SACCHARATE, AMPHETAMINE ASPARTATE, DEXTROAMPHETAMINE SULFATE AND AMPHETAMINE SULFATE 5; 5; 5; 5 MG/1; MG/1; MG/1; MG/1
1 TABLET ORAL 2 TIMES DAILY
Qty: 180 TABLET | Refills: 0 | Status: SHIPPED | OUTPATIENT
Start: 2022-12-25 | End: 2023-04-12 | Stop reason: SDUPTHER

## 2022-12-15 NOTE — PROGRESS NOTES
"Subjective:         Patient ID: Teri Beck is a 62 y.o. female.    Chief Complaint: routine FU for excessive daytime sleepiness; adderall refill     HPI:           The patient location is: residence  Visit type: audiovisual    Cont to feel PAP therapy is beneficial; feels refreshed when awakening  Denies issues with mask/machine. She needs a new PAP machine; she received a letter from [employer] stating her machine is too old and will not be replaced but they will pay her 25 dollars to put towards a new machine. She needs an order for new PAP machine subsequently.     States excessive daytime tiredness is adequately managed w/Adderall 20 mg BID; rarely takes a nap     Rarely takes Trazodone anymore; lets her "internal clock" tell her when to sleep or wake; she no longer forces sleep.     Takes Tramadol every morning or "I cannot get out of bed"; rarely requires a second dose at night.     Does report increase in depression that she relates to recent hospitalization of brother and now she is caring for him while she is in and out of SNF. Denies SI    This is a telemedicine note. After obtaining verbal consent/patient identification using name and , patient was treated using real time audio/video, according to [facility] protocols. IGigi NP [distant provider], conducted the visit from location identified below. The patient participated in the visit at a non-[facility] location selected by the patient (or patients representative), identified below. I am licensed in the state where the patient stated they are located. The patient (or patients representative) stated that they understood and accepted the privacy and security risks to their information at their location.    Distant provider was located at [facility]    Each patient to whom he or she provides medical services by telemedicine is:  (1) informed of the relationship between the physician and patient and the respective role of any other " health care provider with respect to management of the patient; and (2) notified that he or she may decline to receive medical services by telemedicine and may withdraw from such care at any time.    Review of Systems: see HPI           Past Medical History:   Diagnosis Date    Depression     Fibromyalgia     Gastroparesis     GERD (gastroesophageal reflux disease)     Hypercholesteremia     Hypothyroid     Insomnia        Past Surgical History:   Procedure Laterality Date    HERNIA REPAIR  06/2022    HYSTERECTOMY      ROTATOR CUFF REPAIR         Family History   Problem Relation Age of Onset    Dementia Mother     Stroke Mother     Cancer Mother     Dementia Father     Diabetes Father     Diabetes Sister     Diabetes Brother        Social History     Socioeconomic History    Marital status:    Tobacco Use    Smoking status: Never    Smokeless tobacco: Never   Substance and Sexual Activity    Alcohol use: Yes    Drug use: Never       Review of patient's allergies indicates:  No Known Allergies      Current Outpatient Medications:     citalopram (CELEXA) 20 MG tablet, Take by mouth., Disp: , Rfl:     dextroamphetamine-amphetamine (ADDERALL) 20 mg tablet, Take 1 tablet by mouth 2 (two) times daily., Disp: 180 tablet, Rfl: 0    docusate sodium (COLACE) 100 MG capsule, Take 100 mg by mouth 2 (two) times daily., Disp: , Rfl:     levothyroxine (SYNTHROID) 75 MCG tablet, Take 75 mcg by mouth once daily., Disp: , Rfl:     metoclopramide HCl (REGLAN) 5 MG tablet, Take 5 mg by mouth 3 (three) times daily., Disp: , Rfl:     rosuvastatin (CRESTOR) 20 MG tablet, Take 20 mg by mouth nightly., Disp: , Rfl:     senna-docusate 8.6-50 mg (SENNA WITH DOCUSATE SODIUM) 8.6-50 mg per tablet, Take 1 tablet by mouth once daily., Disp: , Rfl:     traMADoL (ULTRAM) 50 mg tablet, Take 50 mg by mouth 2 (two) times daily as needed., Disp: , Rfl:     traZODone (DESYREL) 50 MG tablet, Take 50 mg by mouth nightly as needed., Disp: , Rfl:       Objective:      Physical Exam:  General- Patient alert and oriented x3 in NAD  Speech - nml  HEENT- EOMI  Resp- No increased WOB noted. Not using accessory muscles.  Skin-  No Jaundice. No visible skin lesions.        Assessment/Plan:     Problem List Items Addressed This Visit          Other    KEVEN on CPAP - Primary    Cont to feel PAP therapy is beneficial; feels refreshed when awakening  Denies issues with mask/machine. She needs a new PAP machine; she received a letter from [employer] stating her machine is too old and will not be replaced but they will pay her 25 dollars to put towards a new machine. She needs an order for new PAP machine subsequently.  Order generated for new PAP; setting CPAP 14 cm    DME:[facility]     FU in 8-10 weeks      Excessive daytime sleepiness    Continue the Adderall 20 mg up to twice per day     Insomnia    Continue the Trazodone if needed; she rarely takes it and when she does she takes 1/2 of a 50 mg tab.         Gigi Cummins, MSN, APRN, AGACNP-BC

## 2023-02-14 ENCOUNTER — OFFICE VISIT (OUTPATIENT)
Dept: NEUROLOGY | Facility: CLINIC | Age: 63
End: 2023-02-14
Payer: MEDICARE

## 2023-02-14 VITALS
BODY MASS INDEX: 30.73 KG/M2 | DIASTOLIC BLOOD PRESSURE: 86 MMHG | SYSTOLIC BLOOD PRESSURE: 142 MMHG | HEIGHT: 64 IN | WEIGHT: 180 LBS

## 2023-02-14 DIAGNOSIS — G47.19 EXCESSIVE DAYTIME SLEEPINESS: ICD-10-CM

## 2023-02-14 DIAGNOSIS — G47.33 OSA ON CPAP: Primary | ICD-10-CM

## 2023-02-14 DIAGNOSIS — G62.9 NEUROPATHY: ICD-10-CM

## 2023-02-14 PROCEDURE — 3077F SYST BP >= 140 MM HG: CPT | Mod: CPTII,S$GLB,, | Performed by: NURSE PRACTITIONER

## 2023-02-14 PROCEDURE — 1159F MED LIST DOCD IN RCRD: CPT | Mod: CPTII,S$GLB,, | Performed by: NURSE PRACTITIONER

## 2023-02-14 PROCEDURE — 3008F BODY MASS INDEX DOCD: CPT | Mod: CPTII,S$GLB,, | Performed by: NURSE PRACTITIONER

## 2023-02-14 PROCEDURE — 1160F RVW MEDS BY RX/DR IN RCRD: CPT | Mod: CPTII,S$GLB,, | Performed by: NURSE PRACTITIONER

## 2023-02-14 PROCEDURE — 3008F PR BODY MASS INDEX (BMI) DOCUMENTED: ICD-10-PCS | Mod: CPTII,S$GLB,, | Performed by: NURSE PRACTITIONER

## 2023-02-14 PROCEDURE — 3079F DIAST BP 80-89 MM HG: CPT | Mod: CPTII,S$GLB,, | Performed by: NURSE PRACTITIONER

## 2023-02-14 PROCEDURE — 1160F PR REVIEW ALL MEDS BY PRESCRIBER/CLIN PHARMACIST DOCUMENTED: ICD-10-PCS | Mod: CPTII,S$GLB,, | Performed by: NURSE PRACTITIONER

## 2023-02-14 PROCEDURE — 99999 PR PBB SHADOW E&M-EST. PATIENT-LVL II: ICD-10-PCS | Mod: PBBFAC,,, | Performed by: NURSE PRACTITIONER

## 2023-02-14 PROCEDURE — 3077F PR MOST RECENT SYSTOLIC BLOOD PRESSURE >= 140 MM HG: ICD-10-PCS | Mod: CPTII,S$GLB,, | Performed by: NURSE PRACTITIONER

## 2023-02-14 PROCEDURE — 1159F PR MEDICATION LIST DOCUMENTED IN MEDICAL RECORD: ICD-10-PCS | Mod: CPTII,S$GLB,, | Performed by: NURSE PRACTITIONER

## 2023-02-14 PROCEDURE — 99999 PR PBB SHADOW E&M-EST. PATIENT-LVL II: CPT | Mod: PBBFAC,,, | Performed by: NURSE PRACTITIONER

## 2023-02-14 PROCEDURE — 99214 OFFICE O/P EST MOD 30 MIN: CPT | Mod: S$GLB,,, | Performed by: NURSE PRACTITIONER

## 2023-02-14 PROCEDURE — 3079F PR MOST RECENT DIASTOLIC BLOOD PRESSURE 80-89 MM HG: ICD-10-PCS | Mod: CPTII,S$GLB,, | Performed by: NURSE PRACTITIONER

## 2023-02-14 PROCEDURE — 99214 PR OFFICE/OUTPT VISIT, EST, LEVL IV, 30-39 MIN: ICD-10-PCS | Mod: S$GLB,,, | Performed by: NURSE PRACTITIONER

## 2023-02-14 RX ORDER — DULOXETIN HYDROCHLORIDE 30 MG/1
60 CAPSULE, DELAYED RELEASE ORAL DAILY
Qty: 60 CAPSULE | Refills: 1 | Status: SHIPPED | OUTPATIENT
Start: 2023-02-14 | End: 2023-02-15

## 2023-02-14 RX ORDER — FAMOTIDINE 20 MG/1
20 TABLET, FILM COATED ORAL DAILY PRN
COMMUNITY

## 2023-02-14 RX ORDER — PHENYLEPHRINE HCL 10 MG/1
10 TABLET, FILM COATED ORAL EVERY 4 HOURS PRN
COMMUNITY

## 2023-02-14 RX ORDER — MINERAL OIL
180 ENEMA (ML) RECTAL DAILY PRN
COMMUNITY

## 2023-02-14 NOTE — PROGRESS NOTES
Neurology Follow up Note    Subjective:         Patient ID: Teri Beck is a 62 y.o. female.    Chief Complaint: 8 week follow up after receiving new PAP machine     HPI:            F/U KEVEN.      Wears CPAP qhs  and is tolerating it well. Sleeps better w CPAP. Sleeps 6-7 hrs a night and sleeps all night. Awakens un refreshed and has EDS. Does not nap.    Denies any issues with the mask or the equipment.     DME-[facility].     Compliance 01/14/2023-02/12/2023    AHI-1.9     >4hrs 100%  AHI 1.9    CPAP 14 cm     EPWORTH SLEEPINESS SCALE 2/14/2023   Sitting and reading 1   Watching TV 1   Sitting, inactive in a public place (e.g. a theatre or a meeting) 0   As a passenger in a car for an hour without a break 0   Lying down to rest in the afternoon when circumstances permit 1   Sitting and talking to someone 0   Sitting quietly after a lunch without alcohol 0   In a car, while stopped for a few minutes in traffic 0   Total score 3       Adderall 20 mg BID as needed has helped the excessive daytime sleepiness, focus, concentration     She has R hip pain; chr; did PT - not helpful; tried NSAIDs - not helpful; did have steroid injection years ago and under care of Dr. Hendrickson who is considering another injection; she also tried dry needling which was not helpful. Recent MRI R hip and R knee - management per Dr. Hendrickson.    Does take Celexa and has depression - asking if there is anything we can do to help; denies SI.      ROS: as per HPI, otherwise pertinent systems review is negative          Past Medical History:   Diagnosis Date    Depression     Fibromyalgia     Gastroparesis     GERD (gastroesophageal reflux disease)     Hypercholesteremia     Hypothyroid     Insomnia        Past Surgical History:   Procedure Laterality Date    HERNIA REPAIR  06/2022    HYSTERECTOMY      ROTATOR CUFF REPAIR         Family History   Problem Relation Age of Onset    Dementia Mother     Stroke Mother     Cancer Mother     Dementia  "Father     Diabetes Father     Diabetes Sister     Diabetes Brother        Social History     Socioeconomic History    Marital status:    Tobacco Use    Smoking status: Never    Smokeless tobacco: Never   Substance and Sexual Activity    Alcohol use: Yes    Drug use: Never       Review of patient's allergies indicates:  No Known Allergies      Current Outpatient Medications:     citalopram (CELEXA) 20 MG tablet, Take 40 mg by mouth once daily., Disp: , Rfl:     dextroamphetamine-amphetamine (ADDERALL) 20 mg tablet, Take 1 tablet by mouth 2 (two) times daily., Disp: 180 tablet, Rfl: 0    docusate sodium (COLACE) 100 MG capsule, Take 100 mg by mouth 2 (two) times daily., Disp: , Rfl:     levothyroxine (SYNTHROID) 75 MCG tablet, Take 75 mcg by mouth once daily., Disp: , Rfl:     metoclopramide HCl (REGLAN) 5 MG tablet, Take 5 mg by mouth 3 (three) times daily., Disp: , Rfl:     rosuvastatin (CRESTOR) 20 MG tablet, Take 20 mg by mouth nightly., Disp: , Rfl:     senna-docusate 8.6-50 mg (PERICOLACE) 8.6-50 mg per tablet, Take 1 tablet by mouth once daily., Disp: , Rfl:     traMADoL (ULTRAM) 50 mg tablet, Take 50 mg by mouth 2 (two) times daily as needed., Disp: , Rfl:     traZODone (DESYREL) 50 MG tablet, Take 50 mg by mouth nightly as needed., Disp: , Rfl:     famotidine (PEPCID) 20 MG tablet, Take 20 mg by mouth daily as needed., Disp: , Rfl:     fexofenadine (ALLEGRA) 180 MG tablet, Take 180 mg by mouth daily as needed., Disp: , Rfl:     phenylephrine (SUDAFED PE) 10 MG Tab, Take 10 mg by mouth every 4 (four) hours as needed., Disp: , Rfl:      Objective:      Exam:   BP (!) 142/86   Ht 5' 4" (1.626 m)   Wt 81.6 kg (180 lb)   BMI 30.90 kg/m²     Physical Exam  Vitals reviewed.   Constitutional:      Appearance: Normal appearance.      Accompanied by: alone  HENT:      Ears:      Comments: Hearing normal.  Eyes:      Extraocular Movements: Extraocular movements intact.   Cardiovascular:      Rate and Rhythm: " Normal rate and regular rhythm.   Pulmonary:      Effort: Pulmonary effort is normal.      Breath sounds: Normal breath sounds.   Musculoskeletal:         General: Normal range of motion.   Skin:     General: Skin is warm and dry.   Neurological:      General: No focal deficit present.      Mental Status: she is alert and oriented to person, place, and time.      Gait unassisted and normal.  Psychiatric:         Mood and Affect: Mood normal.         Behavior: Behavior normal.         Assessment/Plan:     Problem List Items Addressed This Visit          Neuro    Neuropathy    Week 1: half dose of Citalopram [20 mg] in the morning plus 30 mg duloxetine  Week 2: stop the Citalopram and increase the Duloxetine to 60 mg daily    Medication administration personally reviewed with patient by MD/provider. Potential or actual medication changes discussed. Common and potentially serious side effects of medications or medication changes discussed.    CBC/CMP 06/2022 was ok          Other    KEVEN on CPAP - Primary  Encouraged continued use of PAP. Drowsy driving may still occur despite PAP use. Clinical follow up and replacement of supplies discussed.    DME:[facility]    FU in 1 year       Excessive daytime sleepiness  Continue Adderall 20 mg up to twice per day if needed  Follow up in 3 months     Depression  See change in meds above          Gigi Cummins, MSN, APRN, AGACNP-BC

## 2023-04-12 DIAGNOSIS — G62.9 NEUROPATHY: ICD-10-CM

## 2023-04-12 DIAGNOSIS — G47.33 OBSTRUCTIVE SLEEP APNEA (ADULT) (PEDIATRIC): ICD-10-CM

## 2023-04-12 DIAGNOSIS — G47.19 EXCESSIVE DAYTIME SLEEPINESS: ICD-10-CM

## 2023-04-12 RX ORDER — DULOXETIN HYDROCHLORIDE 30 MG/1
60 CAPSULE, DELAYED RELEASE ORAL DAILY
Qty: 180 CAPSULE | Refills: 0 | Status: SHIPPED | OUTPATIENT
Start: 2023-04-12 | End: 2023-07-11 | Stop reason: SDUPTHER

## 2023-04-12 RX ORDER — DEXTROAMPHETAMINE SACCHARATE, AMPHETAMINE ASPARTATE, DEXTROAMPHETAMINE SULFATE AND AMPHETAMINE SULFATE 5; 5; 5; 5 MG/1; MG/1; MG/1; MG/1
1 TABLET ORAL 2 TIMES DAILY
Qty: 180 TABLET | Refills: 0 | Status: SHIPPED | OUTPATIENT
Start: 2023-04-12 | End: 2023-07-10 | Stop reason: SDUPTHER

## 2023-05-16 ENCOUNTER — OFFICE VISIT (OUTPATIENT)
Dept: NEUROLOGY | Facility: CLINIC | Age: 63
End: 2023-05-16
Payer: MEDICARE

## 2023-05-16 VITALS
SYSTOLIC BLOOD PRESSURE: 152 MMHG | DIASTOLIC BLOOD PRESSURE: 106 MMHG | WEIGHT: 190 LBS | BODY MASS INDEX: 32.44 KG/M2 | HEIGHT: 64 IN

## 2023-05-16 DIAGNOSIS — G47.19 EXCESSIVE DAYTIME SLEEPINESS: Primary | ICD-10-CM

## 2023-05-16 PROCEDURE — 3077F SYST BP >= 140 MM HG: CPT | Mod: CPTII,,, | Performed by: NURSE PRACTITIONER

## 2023-05-16 PROCEDURE — 99213 OFFICE O/P EST LOW 20 MIN: CPT | Mod: ,,, | Performed by: NURSE PRACTITIONER

## 2023-05-16 PROCEDURE — 3080F PR MOST RECENT DIASTOLIC BLOOD PRESSURE >= 90 MM HG: ICD-10-PCS | Mod: CPTII,,, | Performed by: NURSE PRACTITIONER

## 2023-05-16 PROCEDURE — 3080F DIAST BP >= 90 MM HG: CPT | Mod: CPTII,,, | Performed by: NURSE PRACTITIONER

## 2023-05-16 PROCEDURE — 3008F BODY MASS INDEX DOCD: CPT | Mod: CPTII,,, | Performed by: NURSE PRACTITIONER

## 2023-05-16 PROCEDURE — 1160F RVW MEDS BY RX/DR IN RCRD: CPT | Mod: CPTII,,, | Performed by: NURSE PRACTITIONER

## 2023-05-16 PROCEDURE — 1160F PR REVIEW ALL MEDS BY PRESCRIBER/CLIN PHARMACIST DOCUMENTED: ICD-10-PCS | Mod: CPTII,,, | Performed by: NURSE PRACTITIONER

## 2023-05-16 PROCEDURE — 1159F PR MEDICATION LIST DOCUMENTED IN MEDICAL RECORD: ICD-10-PCS | Mod: CPTII,,, | Performed by: NURSE PRACTITIONER

## 2023-05-16 PROCEDURE — 3008F PR BODY MASS INDEX (BMI) DOCUMENTED: ICD-10-PCS | Mod: CPTII,,, | Performed by: NURSE PRACTITIONER

## 2023-05-16 PROCEDURE — 3077F PR MOST RECENT SYSTOLIC BLOOD PRESSURE >= 140 MM HG: ICD-10-PCS | Mod: CPTII,,, | Performed by: NURSE PRACTITIONER

## 2023-05-16 PROCEDURE — 99999 PR PBB SHADOW E&M-EST. PATIENT-LVL III: CPT | Mod: PBBFAC,,, | Performed by: NURSE PRACTITIONER

## 2023-05-16 PROCEDURE — 99999 PR PBB SHADOW E&M-EST. PATIENT-LVL III: ICD-10-PCS | Mod: PBBFAC,,, | Performed by: NURSE PRACTITIONER

## 2023-05-16 PROCEDURE — 99213 PR OFFICE/OUTPT VISIT, EST, LEVL III, 20-29 MIN: ICD-10-PCS | Mod: ,,, | Performed by: NURSE PRACTITIONER

## 2023-05-16 PROCEDURE — 1159F MED LIST DOCD IN RCRD: CPT | Mod: CPTII,,, | Performed by: NURSE PRACTITIONER

## 2023-05-16 NOTE — PROGRESS NOTES
Neurology Follow up Note    Subjective:         Patient ID: Teri Beck is a 63 y.o. female.    Chief Complaint: F/U KEVEN; excessive daytime sleepiness; Adderall refill     HPI:            Wears CPAP qhs and is tolerating it well. Sleeps better w CPAP. States she stays up til 2 am. Sleeps 6-8 hrs and awakens refreshed and denies EDS. Diff w falling asleep and waking up.Does not nap.  Denies any issues w the mask or the equipment. Changes mask and tubing on a regular basis. DME-[facility].     Compliance 04/15/2023-05/14/2023   AHI-1.4    > 4 hrs 97%    EPWORTH SLEEPINESS SCALE 5/16/2023   Sitting and reading 1   Watching TV 0   Sitting, inactive in a public place (e.g. a theatre or a meeting) 1   As a passenger in a car for an hour without a break 0   Lying down to rest in the afternoon when circumstances permit 1   Sitting and talking to someone 0   Sitting quietly after a lunch without alcohol 0   In a car, while stopped for a few minutes in traffic 0   Total score 3     Excessive daytime sleepiness: the Adderall 20 mg BID is helpful      ROS: as per HPI, otherwise pertinent systems review is negative          Past Medical History:   Diagnosis Date    Depression     Fibromyalgia     Gastroparesis     GERD (gastroesophageal reflux disease)     Hypercholesteremia     Hypothyroid     Insomnia        Past Surgical History:   Procedure Laterality Date    HERNIA REPAIR  06/2022    HYSTERECTOMY      ROTATOR CUFF REPAIR         Family History   Problem Relation Age of Onset    Dementia Mother     Stroke Mother     Cancer Mother     Dementia Father     Diabetes Father     Diabetes Sister     Diabetes Brother        Social History     Socioeconomic History    Marital status:    Tobacco Use    Smoking status: Never    Smokeless tobacco: Never   Substance and Sexual Activity    Alcohol use: Yes    Drug use: Never       Review of patient's allergies indicates:  No Known Allergies    Current Outpatient Medications  "  Medication Instructions    dextroamphetamine-amphetamine (ADDERALL) 20 mg tablet 1 tablet, Oral, 2 times daily    docusate sodium (COLACE) 100 mg, Oral, 2 times daily    DULoxetine (CYMBALTA) 60 mg, Oral, Daily, 1 cap every morning for a week then increase to 2 caps daily thereafter    famotidine (PEPCID) 20 mg, Oral, Daily PRN    fexofenadine (ALLEGRA) 180 mg, Oral, Daily PRN    levothyroxine (SYNTHROID) 75 mcg, Oral, Daily    metoclopramide HCl (REGLAN) 5 mg, Oral, 3 times daily    phenylephrine (SUDAFED PE) 10 mg, Oral, Every 4 hours PRN    rosuvastatin (CRESTOR) 20 mg, Oral, Nightly    senna-docusate 8.6-50 mg (PERICOLACE) 8.6-50 mg per tablet 1 tablet, Oral, Daily    traMADoL (ULTRAM) 50 mg, Oral, 2 times daily PRN    traZODone (DESYREL) 50 mg, Oral, Nightly PRN         Objective:      Exam:   BP (!) 152/106   Ht 5' 4" (1.626 m)   Wt 86.2 kg (190 lb)   BMI 32.61 kg/m²     Physical Exam  Vitals reviewed.   Constitutional:       Appearance: Normal appearance.      Accompanied by: alone   HENT:      Ears:      Comments: Hearing normal.  Eyes:      Extraocular Movements: Extraocular movements intact.      VF's nml     PERRLA  Cardiovascular:      Rate and Rhythm: Normal rate and regular rhythm.   Pulmonary:      Effort: Pulmonary effort is normal.      Breath sounds: Normal breath sounds.   Musculoskeletal:         General: Normal range of motion.   Skin:     General: Skin is warm and dry.   Neurological:      General: No focal deficit present.      Mental Status: she is alert and oriented to person, place, and time.      Speech: nml     Face: symmetric     Motor: nonlateralizing      Gait unassisted and normal.  Psychiatric:         Mood and Affect: Mood normal.         Behavior: Behavior normal.         Assessment/Plan:     Problem List Items Addressed This Visit          Other    Excessive daytime sleepiness - Primary    Continue the Adderall 20 mg up to twice per day     Follow up 3 months          Gigi " Placido, MSN, APRN, AGAP-BC

## 2023-07-10 DIAGNOSIS — G47.19 EXCESSIVE DAYTIME SLEEPINESS: ICD-10-CM

## 2023-07-10 DIAGNOSIS — G47.33 OBSTRUCTIVE SLEEP APNEA (ADULT) (PEDIATRIC): ICD-10-CM

## 2023-07-10 RX ORDER — DEXTROAMPHETAMINE SACCHARATE, AMPHETAMINE ASPARTATE, DEXTROAMPHETAMINE SULFATE AND AMPHETAMINE SULFATE 5; 5; 5; 5 MG/1; MG/1; MG/1; MG/1
1 TABLET ORAL 2 TIMES DAILY
Qty: 180 TABLET | Refills: 0 | Status: SHIPPED | OUTPATIENT
Start: 2023-07-10 | End: 2023-08-01 | Stop reason: SDUPTHER

## 2023-07-11 DIAGNOSIS — G62.9 NEUROPATHY: ICD-10-CM

## 2023-07-11 RX ORDER — DULOXETIN HYDROCHLORIDE 30 MG/1
60 CAPSULE, DELAYED RELEASE ORAL DAILY
Qty: 180 CAPSULE | Refills: 0 | Status: SHIPPED | OUTPATIENT
Start: 2023-07-11 | End: 2023-08-15 | Stop reason: SDUPTHER

## 2023-08-01 ENCOUNTER — PATIENT MESSAGE (OUTPATIENT)
Dept: NEUROLOGY | Facility: CLINIC | Age: 63
End: 2023-08-01
Payer: MEDICARE

## 2023-08-01 DIAGNOSIS — G47.19 EXCESSIVE DAYTIME SLEEPINESS: ICD-10-CM

## 2023-08-01 DIAGNOSIS — G47.33 OBSTRUCTIVE SLEEP APNEA (ADULT) (PEDIATRIC): ICD-10-CM

## 2023-08-01 NOTE — TELEPHONE ENCOUNTER
Medication: Adderall 20 mg    Pharmacy:   [facility] - [place] - [facility] AT [facility]  [place] [address] [address]  Phone: [phone] Fax: [phone]    [facility] #[id number] - [place] - [address] [facility] AT [address] [address]  Phone: [phone] Fax: [phone]       Last Appointment: 05/16/2023    Next Appointment: 08/15/2023    Call back number: [phone]

## 2023-08-02 RX ORDER — DEXTROAMPHETAMINE SACCHARATE, AMPHETAMINE ASPARTATE, DEXTROAMPHETAMINE SULFATE AND AMPHETAMINE SULFATE 5; 5; 5; 5 MG/1; MG/1; MG/1; MG/1
1 TABLET ORAL 2 TIMES DAILY
Qty: 180 TABLET | Refills: 0 | Status: SHIPPED | OUTPATIENT
Start: 2023-08-02 | End: 2023-12-18 | Stop reason: SDUPTHER

## 2023-08-15 ENCOUNTER — OFFICE VISIT (OUTPATIENT)
Dept: NEUROLOGY | Facility: CLINIC | Age: 63
End: 2023-08-15
Payer: MEDICARE

## 2023-08-15 VITALS
WEIGHT: 185 LBS | SYSTOLIC BLOOD PRESSURE: 118 MMHG | HEIGHT: 64 IN | DIASTOLIC BLOOD PRESSURE: 78 MMHG | BODY MASS INDEX: 31.58 KG/M2

## 2023-08-15 DIAGNOSIS — G62.9 NEUROPATHY: ICD-10-CM

## 2023-08-15 DIAGNOSIS — G47.33 OSA ON CPAP: Primary | ICD-10-CM

## 2023-08-15 DIAGNOSIS — G47.19 EXCESSIVE DAYTIME SLEEPINESS: ICD-10-CM

## 2023-08-15 DIAGNOSIS — F32.A DEPRESSION, UNSPECIFIED DEPRESSION TYPE: ICD-10-CM

## 2023-08-15 PROCEDURE — 3078F DIAST BP <80 MM HG: CPT | Mod: CPTII,S$GLB,, | Performed by: NURSE PRACTITIONER

## 2023-08-15 PROCEDURE — 3008F PR BODY MASS INDEX (BMI) DOCUMENTED: ICD-10-PCS | Mod: CPTII,S$GLB,, | Performed by: NURSE PRACTITIONER

## 2023-08-15 PROCEDURE — 3288F FALL RISK ASSESSMENT DOCD: CPT | Mod: CPTII,S$GLB,, | Performed by: NURSE PRACTITIONER

## 2023-08-15 PROCEDURE — 1160F PR REVIEW ALL MEDS BY PRESCRIBER/CLIN PHARMACIST DOCUMENTED: ICD-10-PCS | Mod: CPTII,S$GLB,, | Performed by: NURSE PRACTITIONER

## 2023-08-15 PROCEDURE — 1101F PR PT FALLS ASSESS DOC 0-1 FALLS W/OUT INJ PAST YR: ICD-10-PCS | Mod: CPTII,S$GLB,, | Performed by: NURSE PRACTITIONER

## 2023-08-15 PROCEDURE — 99214 PR OFFICE/OUTPT VISIT, EST, LEVL IV, 30-39 MIN: ICD-10-PCS | Mod: S$GLB,,, | Performed by: NURSE PRACTITIONER

## 2023-08-15 PROCEDURE — 1159F PR MEDICATION LIST DOCUMENTED IN MEDICAL RECORD: ICD-10-PCS | Mod: CPTII,S$GLB,, | Performed by: NURSE PRACTITIONER

## 2023-08-15 PROCEDURE — 99999 PR PBB SHADOW E&M-EST. PATIENT-LVL III: CPT | Mod: PBBFAC,,, | Performed by: NURSE PRACTITIONER

## 2023-08-15 PROCEDURE — 99214 OFFICE O/P EST MOD 30 MIN: CPT | Mod: S$GLB,,, | Performed by: NURSE PRACTITIONER

## 2023-08-15 PROCEDURE — 1101F PT FALLS ASSESS-DOCD LE1/YR: CPT | Mod: CPTII,S$GLB,, | Performed by: NURSE PRACTITIONER

## 2023-08-15 PROCEDURE — 3074F SYST BP LT 130 MM HG: CPT | Mod: CPTII,S$GLB,, | Performed by: NURSE PRACTITIONER

## 2023-08-15 PROCEDURE — 99999 PR PBB SHADOW E&M-EST. PATIENT-LVL III: ICD-10-PCS | Mod: PBBFAC,,, | Performed by: NURSE PRACTITIONER

## 2023-08-15 PROCEDURE — 3008F BODY MASS INDEX DOCD: CPT | Mod: CPTII,S$GLB,, | Performed by: NURSE PRACTITIONER

## 2023-08-15 PROCEDURE — 3074F PR MOST RECENT SYSTOLIC BLOOD PRESSURE < 130 MM HG: ICD-10-PCS | Mod: CPTII,S$GLB,, | Performed by: NURSE PRACTITIONER

## 2023-08-15 PROCEDURE — 3288F PR FALLS RISK ASSESSMENT DOCUMENTED: ICD-10-PCS | Mod: CPTII,S$GLB,, | Performed by: NURSE PRACTITIONER

## 2023-08-15 PROCEDURE — 3078F PR MOST RECENT DIASTOLIC BLOOD PRESSURE < 80 MM HG: ICD-10-PCS | Mod: CPTII,S$GLB,, | Performed by: NURSE PRACTITIONER

## 2023-08-15 PROCEDURE — 1160F RVW MEDS BY RX/DR IN RCRD: CPT | Mod: CPTII,S$GLB,, | Performed by: NURSE PRACTITIONER

## 2023-08-15 PROCEDURE — 1159F MED LIST DOCD IN RCRD: CPT | Mod: CPTII,S$GLB,, | Performed by: NURSE PRACTITIONER

## 2023-08-15 RX ORDER — DULOXETIN HYDROCHLORIDE 30 MG/1
90 CAPSULE, DELAYED RELEASE ORAL DAILY
Qty: 270 CAPSULE | Refills: 0 | Status: SHIPPED | OUTPATIENT
Start: 2023-08-15 | End: 2023-08-18 | Stop reason: SDUPTHER

## 2023-08-15 NOTE — PROGRESS NOTES
Neurology Note - Sleep follow up    Subjective:         Patient ID: Teri Beck is a 63 y.o. female.    Chief Complaint: 3 month hi f/u     HPI:            Pt reports nightly usage of pap machine; pap therapy beneficial for sleep. Refreshed awakenings, denies EDS. Tolerating mask/pressure well.     DME is [facility]    PAP data:   Date range: 7/11/23-8/9/23  % of usage >= 4 hrs: 97  AHI: 1.4  CPAP 14 cm     Adderall 20 mg BID helpful for daytime sleepiness. Reports recent incr in stress and depression; Duloxetine 60 mg daily      ROS: as per HPI, otherwise pertinent systems review is negative            Past Medical History:   Diagnosis Date    Depression     Fibromyalgia     Gastroparesis     GERD (gastroesophageal reflux disease)     Hypercholesteremia     Hypothyroid     Insomnia        Past Surgical History:   Procedure Laterality Date    HERNIA REPAIR  06/2022    HYSTERECTOMY      ROTATOR CUFF REPAIR         Family History   Problem Relation Age of Onset    Dementia Mother     Stroke Mother     Cancer Mother     Dementia Father     Diabetes Father     Diabetes Sister     Diabetes Brother        Social History     Socioeconomic History    Marital status:    Tobacco Use    Smoking status: Never    Smokeless tobacco: Never   Substance and Sexual Activity    Alcohol use: Yes    Drug use: Never       Review of patient's allergies indicates:  No Known Allergies      Current Outpatient Medications:     dextroamphetamine-amphetamine (ADDERALL) 20 mg tablet, Take 1 tablet by mouth 2 (two) times daily., Disp: 180 tablet, Rfl: 0    docusate sodium (COLACE) 100 MG capsule, Take 100 mg by mouth 2 (two) times daily., Disp: , Rfl:     DULoxetine (CYMBALTA) 30 MG capsule, Take 2 capsules (60 mg total) by mouth once daily. 1 cap every morning for a week then increase to 2 caps daily thereafter, Disp: 180 capsule, Rfl: 0    ergocalciferol, vitamin D2, (VITAMIN D ORAL), Take by mouth., Disp: , Rfl:     famotidine (PEPCID)  "20 MG tablet, Take 20 mg by mouth daily as needed., Disp: , Rfl:     fexofenadine (ALLEGRA) 180 MG tablet, Take 180 mg by mouth daily as needed., Disp: , Rfl:     levothyroxine (SYNTHROID) 75 MCG tablet, Take 75 mcg by mouth once daily., Disp: , Rfl:     metoclopramide HCl (REGLAN) 5 MG tablet, Take 5 mg by mouth 3 (three) times daily., Disp: , Rfl:     phenylephrine (SUDAFED PE) 10 MG Tab, Take 10 mg by mouth every 4 (four) hours as needed., Disp: , Rfl:     rosuvastatin (CRESTOR) 20 MG tablet, Take 20 mg by mouth nightly., Disp: , Rfl:     senna-docusate 8.6-50 mg (PERICOLACE) 8.6-50 mg per tablet, Take 1 tablet by mouth once daily., Disp: , Rfl:     traMADoL (ULTRAM) 50 mg tablet, Take 50 mg by mouth 2 (two) times daily as needed., Disp: , Rfl:     traZODone (DESYREL) 50 MG tablet, Take 50 mg by mouth nightly as needed., Disp: , Rfl:      Objective:      Exam:   /78 (BP Location: Left arm, Patient Position: Sitting)   Ht 5' 4" (1.626 m)   Wt 83.9 kg (185 lb)   BMI 31.76 kg/m²     Physical Exam  Vitals reviewed.   Constitutional:       Appearance: Normal appearance.      Accompanied by: alone   HENT:      Ears:      Comments: Hearing normal.  Eyes:      Extraocular Movements: Extraocular movements intact.      VF's ok  Cardiovascular:      Rate and Rhythm: Normal rate and regular rhythm.   Pulmonary:      Effort: Pulmonary effort is normal.      Breath sounds: Normal breath sounds.   Musculoskeletal:         General: Normal range of motion.   Skin:     General: Skin is warm and dry.   Neurological:      General: No focal deficit present.      Mental Status: alert and oriented to person, place, and time.      Speech: nml     Face: symmetric; tongue midline     Motor: nonlateralizing     Gait: unassisted and normal.  Psychiatric:         Mood and Affect: Mood normal.         Behavior: Behavior normal.         Assessment/Plan:     Problem List Items Addressed This Visit          Neuro    " Neuropathy    Continue the Duloxetine        Psychiatric    Depression    Increase Duloxetine to 90 mg daily; Medication administration personally reviewed with patient by MD/provider. Potential or actual medication changes discussed. Common and potentially serious side effects of medications or medication changes discussed.    Rx generated for such        Other    KEVEN on CPAP - Primary    Patient is benefiting from PAP therapy; Encouraged continued use of PAP. Drowsy driving may still occur despite PAP use. Clinical follow up and replacement of supplies discussed.    DME:[facility] in 1 year       Excessive daytime sleepiness    Continue the Adderall 20 mg twice per day          Gigi Cummins, MSN, APRN, AGACNP-BC

## 2023-08-17 ENCOUNTER — TELEPHONE (OUTPATIENT)
Dept: NEUROLOGY | Facility: CLINIC | Age: 63
End: 2023-08-17
Payer: MEDICARE

## 2023-08-17 DIAGNOSIS — G62.9 NEUROPATHY: ICD-10-CM

## 2023-08-17 NOTE — TELEPHONE ENCOUNTER
Re: Cymbalta rx    Pharm sent notification that pt's ins plan only allows 2 tabs per fill; they will not pay for 3 of the 30mg; please change to 1 each of 60 and 30 if this is appropriate

## 2023-08-18 RX ORDER — DULOXETIN HYDROCHLORIDE 30 MG/1
30 CAPSULE, DELAYED RELEASE ORAL DAILY
Qty: 90 CAPSULE | Refills: 0 | Status: SHIPPED | OUTPATIENT
Start: 2023-08-18 | End: 2024-01-23

## 2023-08-18 RX ORDER — DULOXETIN HYDROCHLORIDE 60 MG/1
60 CAPSULE, DELAYED RELEASE ORAL DAILY
Qty: 90 CAPSULE | Refills: 0 | Status: SHIPPED | OUTPATIENT
Start: 2023-08-18 | End: 2024-01-23

## 2023-10-23 ENCOUNTER — LAB VISIT (OUTPATIENT)
Dept: LAB | Facility: HOSPITAL | Age: 63
End: 2023-10-23
Attending: INTERNAL MEDICINE
Payer: MEDICARE

## 2023-10-23 DIAGNOSIS — E55.9 VITAMIN D DEFICIENCY: ICD-10-CM

## 2023-10-23 DIAGNOSIS — E03.9 HYPOTHYROIDISM, UNSPECIFIED TYPE: ICD-10-CM

## 2023-10-23 DIAGNOSIS — E78.00 PURE HYPERCHOLESTEROLEMIA: Primary | ICD-10-CM

## 2023-10-23 LAB
ALBUMIN SERPL-MCNC: 3.6 G/DL (ref 3.4–4.8)
ALBUMIN/GLOB SERPL: 1 RATIO (ref 1.1–2)
ALP SERPL-CCNC: 96 UNIT/L (ref 40–150)
ALT SERPL-CCNC: 18 UNIT/L (ref 0–55)
AST SERPL-CCNC: 24 UNIT/L (ref 5–34)
BASOPHILS # BLD AUTO: 0.02 X10(3)/MCL
BASOPHILS NFR BLD AUTO: 0.4 %
BILIRUB SERPL-MCNC: 0.7 MG/DL
BUN SERPL-MCNC: 12.6 MG/DL (ref 9.8–20.1)
CALCIUM SERPL-MCNC: 9.7 MG/DL (ref 8.4–10.2)
CHLORIDE SERPL-SCNC: 104 MMOL/L (ref 98–107)
CHOLEST SERPL-MCNC: 156 MG/DL
CHOLEST/HDLC SERPL: 3 {RATIO} (ref 0–5)
CO2 SERPL-SCNC: 31 MMOL/L (ref 23–31)
CREAT SERPL-MCNC: 1 MG/DL (ref 0.55–1.02)
DEPRECATED CALCIDIOL+CALCIFEROL SERPL-MC: 18 NG/ML (ref 30–80)
EOSINOPHIL # BLD AUTO: 0.17 X10(3)/MCL (ref 0–0.9)
EOSINOPHIL NFR BLD AUTO: 3.1 %
ERYTHROCYTE [DISTWIDTH] IN BLOOD BY AUTOMATED COUNT: 12.3 % (ref 11.5–17)
GFR SERPLBLD CREATININE-BSD FMLA CKD-EPI: >60 MLS/MIN/1.73/M2
GLOBULIN SER-MCNC: 3.5 GM/DL (ref 2.4–3.5)
GLUCOSE SERPL-MCNC: 97 MG/DL (ref 82–115)
HCT VFR BLD AUTO: 44.5 % (ref 37–47)
HDLC SERPL-MCNC: 60 MG/DL (ref 35–60)
HGB BLD-MCNC: 14.1 G/DL (ref 12–16)
IMM GRANULOCYTES # BLD AUTO: 0 X10(3)/MCL (ref 0–0.04)
IMM GRANULOCYTES NFR BLD AUTO: 0 %
LDLC SERPL CALC-MCNC: 85 MG/DL (ref 50–140)
LYMPHOCYTES # BLD AUTO: 1.73 X10(3)/MCL (ref 0.6–4.6)
LYMPHOCYTES NFR BLD AUTO: 31.2 %
MCH RBC QN AUTO: 31.5 PG (ref 27–31)
MCHC RBC AUTO-ENTMCNC: 31.7 G/DL (ref 33–36)
MCV RBC AUTO: 99.3 FL (ref 80–94)
MONOCYTES # BLD AUTO: 0.43 X10(3)/MCL (ref 0.1–1.3)
MONOCYTES NFR BLD AUTO: 7.8 %
NEUTROPHILS # BLD AUTO: 3.19 X10(3)/MCL (ref 2.1–9.2)
NEUTROPHILS NFR BLD AUTO: 57.5 %
PLATELET # BLD AUTO: 285 X10(3)/MCL (ref 130–400)
PMV BLD AUTO: 8.9 FL (ref 7.4–10.4)
POTASSIUM SERPL-SCNC: 4.3 MMOL/L (ref 3.5–5.1)
PROT SERPL-MCNC: 7.1 GM/DL (ref 5.8–7.6)
RBC # BLD AUTO: 4.48 X10(6)/MCL (ref 4.2–5.4)
SODIUM SERPL-SCNC: 143 MMOL/L (ref 136–145)
TRIGL SERPL-MCNC: 56 MG/DL (ref 37–140)
TSH SERPL-ACNC: 1.18 UIU/ML (ref 0.35–4.94)
VLDLC SERPL CALC-MCNC: 11 MG/DL
WBC # SPEC AUTO: 5.54 X10(3)/MCL (ref 4.5–11.5)

## 2023-10-23 PROCEDURE — 36415 COLL VENOUS BLD VENIPUNCTURE: CPT

## 2023-10-23 PROCEDURE — 85025 COMPLETE CBC W/AUTO DIFF WBC: CPT

## 2023-10-23 PROCEDURE — 84443 ASSAY THYROID STIM HORMONE: CPT

## 2023-10-23 PROCEDURE — 82306 VITAMIN D 25 HYDROXY: CPT

## 2023-10-23 PROCEDURE — 80061 LIPID PANEL: CPT

## 2023-10-23 PROCEDURE — 80053 COMPREHEN METABOLIC PANEL: CPT

## 2023-12-18 DIAGNOSIS — G47.19 EXCESSIVE DAYTIME SLEEPINESS: ICD-10-CM

## 2023-12-18 DIAGNOSIS — G47.33 OBSTRUCTIVE SLEEP APNEA (ADULT) (PEDIATRIC): ICD-10-CM

## 2023-12-18 RX ORDER — DEXTROAMPHETAMINE SACCHARATE, AMPHETAMINE ASPARTATE, DEXTROAMPHETAMINE SULFATE AND AMPHETAMINE SULFATE 5; 5; 5; 5 MG/1; MG/1; MG/1; MG/1
1 TABLET ORAL 2 TIMES DAILY
Qty: 180 TABLET | Refills: 0 | Status: SHIPPED | OUTPATIENT
Start: 2023-12-18 | End: 2024-03-21 | Stop reason: SDUPTHER

## 2024-01-22 ENCOUNTER — PATIENT MESSAGE (OUTPATIENT)
Dept: NEUROLOGY | Facility: CLINIC | Age: 64
End: 2024-01-22
Payer: MEDICARE

## 2024-01-23 ENCOUNTER — OFFICE VISIT (OUTPATIENT)
Dept: NEUROLOGY | Facility: CLINIC | Age: 64
End: 2024-01-23
Payer: MEDICARE

## 2024-01-23 VITALS
WEIGHT: 185 LBS | SYSTOLIC BLOOD PRESSURE: 132 MMHG | DIASTOLIC BLOOD PRESSURE: 94 MMHG | HEIGHT: 64 IN | BODY MASS INDEX: 31.58 KG/M2

## 2024-01-23 DIAGNOSIS — G47.19 EXCESSIVE DAYTIME SLEEPINESS: ICD-10-CM

## 2024-01-23 DIAGNOSIS — G47.33 OSA ON CPAP: Primary | ICD-10-CM

## 2024-01-23 PROCEDURE — 3008F BODY MASS INDEX DOCD: CPT | Mod: CPTII,S$GLB,, | Performed by: NURSE PRACTITIONER

## 2024-01-23 PROCEDURE — 99999 PR PBB SHADOW E&M-EST. PATIENT-LVL III: CPT | Mod: PBBFAC,,, | Performed by: NURSE PRACTITIONER

## 2024-01-23 PROCEDURE — 99214 OFFICE O/P EST MOD 30 MIN: CPT | Mod: S$GLB,,, | Performed by: NURSE PRACTITIONER

## 2024-01-23 PROCEDURE — 3075F SYST BP GE 130 - 139MM HG: CPT | Mod: CPTII,S$GLB,, | Performed by: NURSE PRACTITIONER

## 2024-01-23 PROCEDURE — 1160F RVW MEDS BY RX/DR IN RCRD: CPT | Mod: CPTII,S$GLB,, | Performed by: NURSE PRACTITIONER

## 2024-01-23 PROCEDURE — 1159F MED LIST DOCD IN RCRD: CPT | Mod: CPTII,S$GLB,, | Performed by: NURSE PRACTITIONER

## 2024-01-23 PROCEDURE — 3080F DIAST BP >= 90 MM HG: CPT | Mod: CPTII,S$GLB,, | Performed by: NURSE PRACTITIONER

## 2024-01-23 RX ORDER — CITALOPRAM 20 MG/1
40 TABLET, FILM COATED ORAL DAILY
COMMUNITY

## 2024-01-23 NOTE — PROGRESS NOTES
Neurology Note - Sleep follow up    Subjective:         Patient ID: Teri Beck is a 63 y.o. female.    Chief Complaint: F/U KEVEN; EDS    HPI:            Wears CPAP qhs and is tolerating it well. Sleeps better w CPAP. Sleeps 6 hrs a night and sleeps all night. Awakens un refreshed and denies EDS. Does not nap. Changes mask and tubing on a regular basis. Denies any issues w mask or equipment.    Stopped the Duloxetine - difficulty with dosing and was not helping the hip pain so she resumed the Citalopram    DME-[facility].   Compliance-12/22/2023-01/20/2024    AHI-1.3    > 4 hrs 97%  CPAP 14 cm       ROS: as per HPI, otherwise pertinent systems review is negative              1/23/2024     8:07 AM   EPWORTH SLEEPINESS SCALE   Sitting and reading 1   Watching TV 0   Sitting, inactive in a public place (e.g. a theatre or a meeting) 0   As a passenger in a car for an hour without a break 0   Lying down to rest in the afternoon when circumstances permit 1   Sitting and talking to someone 0   Sitting quietly after a lunch without alcohol 1   In a car, while stopped for a few minutes in traffic 0   Total score 3        Past Medical History:   Diagnosis Date    Depression     Fibromyalgia     Gastroparesis     GERD (gastroesophageal reflux disease)     Hypercholesteremia     Hypothyroid     Insomnia        Past Surgical History:   Procedure Laterality Date    HERNIA REPAIR  06/2022    HYSTERECTOMY      ROTATOR CUFF REPAIR         Family History   Problem Relation Age of Onset    Dementia Mother     Stroke Mother     Cancer Mother     Dementia Father     Diabetes Father     Diabetes Sister     Diabetes Brother        Social History     Socioeconomic History    Marital status:    Tobacco Use    Smoking status: Never    Smokeless tobacco: Never   Substance and Sexual Activity    Alcohol use: Yes    Drug use: Never       Review of patient's allergies indicates:  No Known Allergies    Current Outpatient Medications  "  Medication Instructions    citalopram (CELEXA) 40 mg, Oral, Daily    dextroamphetamine-amphetamine (ADDERALL) 20 mg tablet 1 tablet, Oral, 2 times daily    docusate sodium (COLACE) 100 mg, Oral, 2 times daily PRN    ergocalciferol, vitamin D2, (VITAMIN D ORAL) Oral    famotidine (PEPCID) 20 mg, Oral, Daily PRN    fexofenadine (ALLEGRA) 180 mg, Oral, Daily PRN    levothyroxine (SYNTHROID) 75 mcg, Oral, Daily    metoclopramide HCl (REGLAN) 5 mg, Oral, 3 times daily PRN    phenylephrine (SUDAFED PE) 10 mg, Oral, Every 4 hours PRN    rosuvastatin (CRESTOR) 20 mg, Oral, Nightly    senna-docusate 8.6-50 mg (PERICOLACE) 8.6-50 mg per tablet 1 tablet, Oral, Daily    traMADoL (ULTRAM) 50 mg, Oral, 2 times daily PRN    traZODone (DESYREL) 50 mg, Oral, Nightly PRN         Objective:      Exam:   BP (!) 132/94   Ht 5' 4" (1.626 m)   Wt 83.9 kg (185 lb)   BMI 31.76 kg/m²     Physical Exam  Vitals reviewed.   Constitutional:       Appearance: Normal appearance.      Accompanied by: alone   HENT:      Ears:      Comments: Hearing normal.  Eyes:      Extraocular Movements: Extraocular movements intact.      VF's ok  Cardiovascular:      Rate and Rhythm: Normal rate and regular rhythm.   Pulmonary:      Effort: Pulmonary effort is normal.      Breath sounds: Normal breath sounds.   Musculoskeletal:         General: Normal range of motion.   Skin:     General: Skin is warm and dry.   Neurological:      General: No focal deficit present.      Mental Status: alert and oriented to person, place, and time.      Speech: nml     Face: symmetric; tongue midline     Motor: nonlateralizing     Gait: unassisted and normal.  Psychiatric:         Mood and Affect: Mood normal.         Behavior: Behavior normal.         Assessment/Plan:     Problem List Items Addressed This Visit       KEVEN on CPAP - Primary    Excessive daytime sleepiness    Patient is benefiting from PAP therapy; Encouraged continued use of PAP. Drowsy driving may still " occur despite PAP use. Clinical follow up and replacement of supplies discussed.    DME:[facility]    Continue the Adderall 20 mg every morning and at noon - remains efficacious    Follow up in 3 months          Gigi Cummins, MSN, APRN, AGACNP-BC

## 2024-03-21 DIAGNOSIS — G47.33 OBSTRUCTIVE SLEEP APNEA (ADULT) (PEDIATRIC): ICD-10-CM

## 2024-03-21 DIAGNOSIS — G47.19 EXCESSIVE DAYTIME SLEEPINESS: ICD-10-CM

## 2024-03-22 RX ORDER — DEXTROAMPHETAMINE SACCHARATE, AMPHETAMINE ASPARTATE, DEXTROAMPHETAMINE SULFATE AND AMPHETAMINE SULFATE 5; 5; 5; 5 MG/1; MG/1; MG/1; MG/1
1 TABLET ORAL 2 TIMES DAILY
Qty: 180 TABLET | Refills: 0 | Status: SHIPPED | OUTPATIENT
Start: 2024-03-22 | End: 2024-06-20

## 2024-04-26 ENCOUNTER — PATIENT MESSAGE (OUTPATIENT)
Dept: NEUROLOGY | Facility: CLINIC | Age: 64
End: 2024-04-26
Payer: MEDICARE

## 2024-04-30 ENCOUNTER — OFFICE VISIT (OUTPATIENT)
Dept: NEUROLOGY | Facility: CLINIC | Age: 64
End: 2024-04-30
Payer: MEDICARE

## 2024-04-30 VITALS
WEIGHT: 173 LBS | SYSTOLIC BLOOD PRESSURE: 132 MMHG | HEIGHT: 63 IN | BODY MASS INDEX: 30.65 KG/M2 | DIASTOLIC BLOOD PRESSURE: 88 MMHG

## 2024-04-30 DIAGNOSIS — G47.33 OSA ON CPAP: Primary | ICD-10-CM

## 2024-04-30 DIAGNOSIS — G47.19 EXCESSIVE DAYTIME SLEEPINESS: ICD-10-CM

## 2024-04-30 PROCEDURE — 3008F BODY MASS INDEX DOCD: CPT | Mod: CPTII,S$GLB,, | Performed by: NURSE PRACTITIONER

## 2024-04-30 PROCEDURE — 3079F DIAST BP 80-89 MM HG: CPT | Mod: CPTII,S$GLB,, | Performed by: NURSE PRACTITIONER

## 2024-04-30 PROCEDURE — 3075F SYST BP GE 130 - 139MM HG: CPT | Mod: CPTII,S$GLB,, | Performed by: NURSE PRACTITIONER

## 2024-04-30 PROCEDURE — 99213 OFFICE O/P EST LOW 20 MIN: CPT | Mod: S$GLB,,, | Performed by: NURSE PRACTITIONER

## 2024-04-30 PROCEDURE — 99999 PR PBB SHADOW E&M-EST. PATIENT-LVL III: CPT | Mod: PBBFAC,,, | Performed by: NURSE PRACTITIONER

## 2024-04-30 PROCEDURE — 1160F RVW MEDS BY RX/DR IN RCRD: CPT | Mod: CPTII,S$GLB,, | Performed by: NURSE PRACTITIONER

## 2024-04-30 PROCEDURE — 1159F MED LIST DOCD IN RCRD: CPT | Mod: CPTII,S$GLB,, | Performed by: NURSE PRACTITIONER

## 2024-04-30 NOTE — PROGRESS NOTES
Neurology Note - Sleep follow up    Subjective:         Patient ID: Teri Beck is a 63 y.o. female.    Chief Complaint: F/U KEVEN.     HPI:            Wears CPAP qhs and is tolerating it well. Sleeps better w CPAP. Sleeps 6-8 hrs a night and awakens 1 times for no reason and is able to go right back to sleep. Awakens unrefreshed and occs has EDS. Does not nap. Changes mask and tubing on a regular basis. Denies any issues w mask or equipment.    DME-[employer].     Compliance 03/30/2024-04/28/2024    AHI-1.0    > 4 hrs 97%  CPAP 14 cm     ROS: as per HPI, otherwise pertinent systems review is negative              4/30/2024    10:08 AM   EPWORTH SLEEPINESS SCALE   Sitting and reading 1   Watching TV 0   Sitting, inactive in a public place (e.g. a theatre or a meeting) 0   As a passenger in a car for an hour without a break 1   Lying down to rest in the afternoon when circumstances permit 0   Sitting and talking to someone 0   Sitting quietly after a lunch without alcohol 0   In a car, while stopped for a few minutes in traffic 0   Total score 2        Past Medical History:   Diagnosis Date    Depression     Fibromyalgia     Gastroparesis     GERD (gastroesophageal reflux disease)     Hypercholesteremia     Hypothyroid     Insomnia        Past Surgical History:   Procedure Laterality Date    HERNIA REPAIR  06/2022    HYSTERECTOMY      ROTATOR CUFF REPAIR         Family History   Problem Relation Name Age of Onset    Dementia Mother      Stroke Mother      Cancer Mother      Dementia Father      Diabetes Father      Diabetes Sister      Diabetes Brother         Social History     Socioeconomic History    Marital status:    Tobacco Use    Smoking status: Never    Smokeless tobacco: Never   Substance and Sexual Activity    Alcohol use: Yes    Drug use: Never       Review of patient's allergies indicates:  No Known Allergies    Current Outpatient Medications   Medication Instructions    citalopram (CELEXA) 40 mg,  "Oral, Daily    dextroamphetamine-amphetamine (ADDERALL) 20 mg tablet 1 tablet, Oral, 2 times daily    docusate sodium (COLACE) 100 mg, Oral, 2 times daily PRN    ergocalciferol, vitamin D2, (VITAMIN D ORAL) Oral    famotidine (PEPCID) 20 mg, Oral, Daily PRN    fexofenadine (ALLEGRA) 180 mg, Oral, Daily PRN    levothyroxine (SYNTHROID) 75 mcg, Oral, Daily    metoclopramide HCl (REGLAN) 5 mg, Oral, 3 times daily PRN    phenylephrine (SUDAFED PE) 10 mg, Oral, Every 4 hours PRN    rosuvastatin (CRESTOR) 20 mg, Oral, Nightly    senna-docusate 8.6-50 mg (PERICOLACE) 8.6-50 mg per tablet 1 tablet, Oral, Daily PRN    traMADoL (ULTRAM) 50 mg, Oral, 2 times daily PRN    traZODone (DESYREL) 50 mg, Oral, Nightly PRN         Objective:      Exam:   /88   Ht 5' 3" (1.6 m)   Wt 78.5 kg (173 lb)   BMI 30.65 kg/m²     Physical Exam  Vitals reviewed.   Constitutional:       Appearance: Normal appearance.      Accompanied by: alone   HENT:      Ears:      Comments: Hearing normal.  Eyes:      Extraocular Movements: Extraocular movements intact.      VF's ok  Cardiovascular:      Rate and Rhythm: Normal rate and regular rhythm.   Pulmonary:      Effort: Pulmonary effort is normal.      Breath sounds: Normal breath sounds.   Musculoskeletal:         General: Normal range of motion.   Skin:     General: Skin is warm and dry.   Neurological:      General: No focal deficit present.      Mental Status: alert and oriented to person, place, and time.      Speech: nml     Face: symmetric; tongue midline     Motor: nonlateralizing     Gait: unassisted and normal.  Psychiatric:         Mood and Affect: Mood normal.         Behavior: Behavior normal.         Assessment/Plan:     Problem List Items Addressed This Visit       KEVEN on CPAP - Primary    Excessive daytime sleepiness    Patient is benefiting from PAP therapy; Encouraged continued use of PAP. Drowsy driving may still occur despite PAP use. Clinical follow up and replacement of " supplies discussed.    Continue the Adderall 20 mg tab, 1.5 tab every morning and 0.5 tab at noon    DME:[facility] in 3 months - virtual            Gigi Cummins, MSN, APRN, AGACNP-BC

## 2024-06-03 ENCOUNTER — HOSPITAL ENCOUNTER (OUTPATIENT)
Dept: WOUND CARE | Facility: HOSPITAL | Age: 64
Discharge: HOME | End: 2024-06-03
Attending: EMERGENCY MEDICINE
Payer: MEDICARE

## 2024-06-03 VITALS
WEIGHT: 185 LBS | SYSTOLIC BLOOD PRESSURE: 144 MMHG | BODY MASS INDEX: 32.78 KG/M2 | TEMPERATURE: 98 F | DIASTOLIC BLOOD PRESSURE: 84 MMHG | HEART RATE: 101 BPM | HEIGHT: 63 IN | RESPIRATION RATE: 16 BRPM

## 2024-06-03 DIAGNOSIS — K94.20 COMPLICATION OF FEEDING TUBE: ICD-10-CM

## 2024-06-03 DIAGNOSIS — L25.8 CONTACT DERMATITIS DUE TO OTHER AGENT, UNSPECIFIED CONTACT DERMATITIS TYPE: Primary | ICD-10-CM

## 2024-06-03 DIAGNOSIS — K44.9 HIATAL HERNIA: ICD-10-CM

## 2024-06-03 PROBLEM — L25.9 CONTACT DERMATITIS: Status: ACTIVE | Noted: 2024-06-03

## 2024-06-03 PROCEDURE — 99204 OFFICE O/P NEW MOD 45 MIN: CPT | Mod: ,,, | Performed by: EMERGENCY MEDICINE

## 2024-06-03 PROCEDURE — 27000999 HC MEDICAL RECORD PHOTO DOCUMENTATION

## 2024-06-03 PROCEDURE — 99214 OFFICE O/P EST MOD 30 MIN: CPT

## 2024-06-03 RX ORDER — NYSTATIN 100000 [USP'U]/G
POWDER TOPICAL DAILY
Qty: 60 G | Refills: 0 | Status: SHIPPED | OUTPATIENT
Start: 2024-06-03 | End: 2024-07-03

## 2024-06-03 NOTE — PATIENT INSTRUCTIONS
Pt seen today by: Kelsey Snyder MD    Home health and self care DRESSING INSTRUCTIONS:        Wound location: Abdomen( Peg tube site)    Dressings to be changed  daily and as needed if soiled or not intact.  Cleanse wound with wound cleanser or saline    Apply gentian violet around the peg tube and allow to dry  You may also apply the zinc oxide over gentian violet               Cover with cut gauze     You may also try cavilion barrier        Compression with: N/A    Return visit: Give us a call at [phone] if you need to return for any open wounds       Wound may have been debrided in clinic: if so, WHAT YOU NEED TO KNOW:        Nutrition:  The current daily value (%DV) for protein is 50 grams per day and is meant as a general goal for most people. Further increasing your dietary protein intake is very important for wound healing. Typically one needs over 100g of protein per day to help with wound healing needs.  If you are a dialysis patient or have problems with your kidneys, talk to your Nephrologist about how much protein you can take in with your condition.  Examples of high protein items that can be added to your diet include: eggs, chicken, red meats, almonds, cottage cheese, Greek yogurt, beans, and peanut butter.  Fortified protein bars, shakes and drinks can add 15-30 additional grams of protein per serving.   Also add:   1 daily general multivitamin   North : 1 packet twice daily   Vitamin C : 500mg twice daily   Zinc 220 mg daily  Vit D : once daily        Call our [facility] wound clinic for questions/concerns a [phone] .

## 2024-06-03 NOTE — PROGRESS NOTES
Outpatient Wound Care [facility]      Subjective:       Patient ID: Teri Beck is a 64 y.o. female.    Chief Complaint: Wound Consult    New patient    Cc:  help with [place] skin irritation      64-year-old white female we had a history of a large hiatal hernia which required a complicated type of surgery resulting in anchoring of the stomach to the peritoneum with a G-tube.  This was done about 2 years ago.  She does not use the tube for feeding but must remain with it because she was told the hiatal hernia would resume if this was taken out.  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  She is battling resultant skin irritation and dermatitis from the gastric leakage.  She saw GI Dr. Hans Navarro about it on 5/15/24.  The PEG button was removed and replaced with a 24 [place] PEG tube.  They referred her to wound care clinic for help the skin issue. We did not receive the fact for the referral until May 29th.  She comes in today on Monday 6/3/24 for first evaluation.  She has found that the PEG tube is helping and she has not had as much gastric output around the site.  Her wound care has consisted of protection of the skin with zinc oxide as well as using absorbent pads between the phalange in the skin.  She states right now it is actually currently controlled but it is hard to predict the amount of drainage that she may have from day-to-day.    Past Medical History:  No date: Depression  No date: Fibromyalgia  No date: Gastroparesis  No date: GERD (gastroesophageal reflux disease)  No date: Hypercholesteremia  No date: Hypothyroid  No date: Insomnia  No date: Sleep apnea, unspecified  Past Surgical History:  No date: brest reduction; Bilateral  06/2022: HERNIA REPAIR  No date: HYSTERECTOMY  No date: ROTATOR CUFF REPAIR  No date: tna; Bilateral  No date: WRIST SURGERY; Left          Review of Systems   Constitutional: Negative.    HENT: Negative.    "  Respiratory: Negative.     Cardiovascular: Negative.    Gastrointestinal:  Negative for abdominal distention, constipation, diarrhea, nausea and vomiting.   Skin:  Positive for rash.   Neurological: Negative.          Objective:      Vitals:    06/03/24 0955   BP: (!) 144/84   Pulse: 101   Resp: 16   Temp: 98.2 °F (36.8 °C)     @poctglucose@  No results for input(s): "POCTGLUCOSE" in the last 24 hours.  Physical Exam  Vitals reviewed.   Pulmonary:      Effort: Pulmonary effort is normal.   Abdominal:       Skin:     Capillary Refill: Capillary refill takes less than 2 seconds.   Neurological:      General: No focal deficit present.      Mental Status: She is alert and oriented to person, place, and time. Mental status is at baseline.              Wound 06/03/24 1008 Other (comment) medial Upper quadrant #1 (Active)   06/03/24 1008   Present on Original Admission: Y   Primary Wound Type: Other   Side:    Orientation: medial   Location: Upper quadrant   Wound Approximate Age at First Assessment (Weeks):    Wound Number: #1   Is this injury device related?: No   Incision Type:    Closure Method:    Wound Description (Comments):    Type:    Additional Comments:    Ankle-Brachial Index:    Pulses:    Removal Indication and Assessment:    Wound Outcome:    Wound Image   06/03/24 1014   Dressing Appearance Intact;Moist drainage 06/03/24 1014   Drainage Amount Small 06/03/24 1014   Drainage Characteristics/Odor Yellow;Serosanguineous 06/03/24 1014   Appearance Pink;Epithelialization 06/03/24 1014   Black (%), Wound Tissue Color 0 % 06/03/24 1014   Red (%), Wound Tissue Color 100 % 06/03/24 1014   Yellow (%), Wound Tissue Color 0 % 06/03/24 1014   Periwound Area Redness 06/03/24 1014   Wound Length (cm) 3.5 cm 06/03/24 1014   Wound Width (cm) 3.5 cm 06/03/24 1014   Wound Depth (cm) 0.1 cm 06/03/24 1014   Wound Volume (cm^3) 1.225 cm^3 06/03/24 1014   Wound Surface Area (cm^2) 12.25 cm^2 06/03/24 1014   Care Cleansed " with:;Antimicrobial agent 06/03/24 1014   Dressing Applied;Methylene blue/gentian violet;Gauze 06/03/24 1014           Assessment:       1. Contact dermatitis due to other agent, unspecified contact dermatitis type    2. Complication of feeding tube    3. Hiatal hernia              Lab Results   Component Value Date    WBC 5.54 10/23/2023    HGB 14.1 10/23/2023    HCT 44.5 10/23/2023    MCV 99.3 (H) 10/23/2023     10/23/2023         CMP  Sodium   Date Value Ref Range Status   10/23/2023 143 136 - 145 mmol/L Final   06/30/2022 137 136 - 145 mmol/L Final     Potassium   Date Value Ref Range Status   10/23/2023 4.3 3.5 - 5.1 mmol/L Final   06/30/2022 3.5 3.5 - 5.1 mmol/L Final     Chloride   Date Value Ref Range Status   10/23/2023 104 98 - 107 mmol/L Final   06/30/2022 104 100 - 109 mmol/L Final     CO2   Date Value Ref Range Status   10/23/2023 31 23 - 31 mmol/L Final     Carbon Dioxide   Date Value Ref Range Status   06/30/2022 29 22 - 33 mmol/L Final     Blood Urea Nitrogen   Date Value Ref Range Status   10/23/2023 12.6 9.8 - 20.1 mg/dL Final   06/30/2022 4 (L) 5 - 25 mg/dL Final     Creatinine   Date Value Ref Range Status   10/23/2023 1.00 0.55 - 1.02 mg/dL Final   06/30/2022 0.66 0.57 - 1.25 mg/dL Final     Calcium   Date Value Ref Range Status   10/23/2023 9.7 8.4 - 10.2 mg/dL Final   06/30/2022 8.5 (L) 8.8 - 10.6 mg/dL Final     Albumin   Date Value Ref Range Status   10/23/2023 3.6 3.4 - 4.8 g/dL Final     Bilirubin Total   Date Value Ref Range Status   10/23/2023 0.7 <=1.5 mg/dL Final     ALP   Date Value Ref Range Status   10/23/2023 96 40 - 150 unit/L Final     AST   Date Value Ref Range Status   10/23/2023 24 5 - 34 unit/L Final     ALT   Date Value Ref Range Status   10/23/2023 18 0 - 55 unit/L Final     Anion Gap   Date Value Ref Range Status   06/30/2022 4 (L) 8 - 16 mmol/L Final     eGFR   Date Value Ref Range Status   10/23/2023 >60 mls/min/1.73/m2 Final       Plan:     Plan of Care:    This  is a new patient referred to this clinic for opinion on helping the skin around the PEG tube site that suffers contact dermatitis from gastric leakage.  In mid May the PEG button was changed to a regular 24 French PEG tube.  Patient feels that this has helped the drainage and it does not seem to come out as much.  She does not currently have an open wound but she does have reddened circular fuad from the dermatitis  We spoke about different methods that we used to try to protect the skin from the effluent.  She can always continue to use his zinc oxide.  Other methods we can try would be to apply gentian violet  or  use a combination of an antifungal powder layered with a skin protected such as sure prep.  I can call her in a prescription of the powder.  I gave her a sample of the sure prep and if she likes it she will order some more.   We went through the steps for this as well.  Return to clinic:  She will try the above recommendations and call us if needed.           The time spent including preparing to see the patient, obtaining patient history and assessment, evaluation of the plan of care, patient/caregiver counseling and education, orders, documentation, coordination of care, and other professional medical management activities for today's encounter was 45 minutes.

## 2024-07-01 DIAGNOSIS — G47.19 EXCESSIVE DAYTIME SLEEPINESS: ICD-10-CM

## 2024-07-01 DIAGNOSIS — G47.33 OBSTRUCTIVE SLEEP APNEA (ADULT) (PEDIATRIC): ICD-10-CM

## 2024-07-01 RX ORDER — DEXTROAMPHETAMINE SACCHARATE, AMPHETAMINE ASPARTATE, DEXTROAMPHETAMINE SULFATE AND AMPHETAMINE SULFATE 5; 5; 5; 5 MG/1; MG/1; MG/1; MG/1
1 TABLET ORAL 2 TIMES DAILY
Qty: 180 TABLET | Refills: 0 | Status: SHIPPED | OUTPATIENT
Start: 2024-07-01 | End: 2024-09-29

## 2024-07-24 ENCOUNTER — PATIENT MESSAGE (OUTPATIENT)
Dept: NEUROLOGY | Facility: CLINIC | Age: 64
End: 2024-07-24
Payer: MEDICARE

## 2024-08-01 ENCOUNTER — OFFICE VISIT (OUTPATIENT)
Dept: NEUROLOGY | Facility: CLINIC | Age: 64
End: 2024-08-01
Payer: MEDICARE

## 2024-08-01 ENCOUNTER — PATIENT MESSAGE (OUTPATIENT)
Dept: NEUROLOGY | Facility: CLINIC | Age: 64
End: 2024-08-01

## 2024-08-01 DIAGNOSIS — G47.19 EXCESSIVE DAYTIME SLEEPINESS: Primary | ICD-10-CM

## 2024-08-01 DIAGNOSIS — G47.33 OSA ON CPAP: ICD-10-CM

## 2024-08-01 PROCEDURE — 99213 OFFICE O/P EST LOW 20 MIN: CPT | Mod: 95,,, | Performed by: NURSE PRACTITIONER

## 2024-08-01 PROCEDURE — 1160F RVW MEDS BY RX/DR IN RCRD: CPT | Mod: CPTII,95,, | Performed by: NURSE PRACTITIONER

## 2024-08-01 PROCEDURE — 1159F MED LIST DOCD IN RCRD: CPT | Mod: CPTII,95,, | Performed by: NURSE PRACTITIONER

## 2024-08-01 NOTE — PROGRESS NOTES
Virtual Visit Note    Subjective:          Patient ID: Teri Beck is a 64 y.o. female.    Chief Complaint: excessive daytime sleepiness; Adderall refill    HPI:           The patient location is: home   Visit type: audiovisual    Wears CPAP qhs and is tolerating it well. Sleeps better w CPAP. Denies any issues w mask or equipment.     DME-[employer].      Compliance 2024-2024  AHI-1.5  > 4 hrs    CPAP 14 cm     Adderall 20 mg BID remains efficacious; denies excessive daytime sleepiness        2024    10:08 AM   EPWORTH SLEEPINESS SCALE   Sitting and reading 1   Watching TV 0   Sitting, inactive in a public place (e.g. a theatre or a meeting) 0   As a passenger in a car for an hour without a break 1   Lying down to rest in the afternoon when circumstances permit 0   Sitting and talking to someone 0   Sitting quietly after a lunch without alcohol 0   In a car, while stopped for a few minutes in traffic 0   Total score 2         This is a telemedicine note. After obtaining verbal consent/patient identification using name and , patient was treated using real time audio/video, according to [facility] protocols. Gigi THOMPSON NP [distant provider], conducted the visit from location identified below. The patient participated in the visit at a non-[facility] location selected by the patient (or patients representative), identified below. I am licensed in the state where the patient stated they are located. The patient (or patients representative) stated that they understood and accepted the privacy and security risks to their information at their location.    Distant provider was located at [facility]    Each patient to whom he or she provides medical services by telemedicine is:  (1) informed of the relationship between the physician and patient and the respective role of any other health care provider with respect to management of the patient; and (2) notified that he or she  may decline to receive medical services by telemedicine and may withdraw from such care at any time.          Past Medical History:   Diagnosis Date    Depression     Fibromyalgia     Gastroparesis     GERD (gastroesophageal reflux disease)     Hypercholesteremia     Hypothyroid     Insomnia     Sleep apnea, unspecified        Past Surgical History:   Procedure Laterality Date    brest reduction Bilateral     HERNIA REPAIR  06/2022    HYSTERECTOMY      ROTATOR CUFF REPAIR      tna Bilateral     WRIST SURGERY Left        Family History   Problem Relation Name Age of Onset    Dementia Mother      Stroke Mother      Cancer Mother      Dementia Father      Diabetes Father      Liver disease Father      Diabetes Sister      Diabetes Brother         Social History     Socioeconomic History    Marital status:    Tobacco Use    Smoking status: Never    Smokeless tobacco: Never   Substance and Sexual Activity    Alcohol use: Not Currently    Drug use: Never       Review of patient's allergies indicates:  No Known Allergies    Current Outpatient Medications   Medication Instructions    citalopram (CELEXA) 40 mg, Oral, Daily    dextroamphetamine-amphetamine (ADDERALL) 20 mg tablet 1 tablet, Oral, 2 times daily    docusate sodium (COLACE) 100 mg, Oral, 2 times daily PRN    ergocalciferol, vitamin D2, (VITAMIN D ORAL) Oral    famotidine (PEPCID) 20 mg, Oral, Daily PRN    fexofenadine (ALLEGRA) 180 mg, Oral, Daily PRN    levothyroxine (SYNTHROID) 75 mcg, Oral, Daily    metoclopramide HCl (REGLAN) 5 mg, Oral, 3 times daily PRN    nystatin (MYCOSTATIN) powder Topical (Top), Daily    phenylephrine (SUDAFED PE) 10 mg, Oral, Every 4 hours PRN    rosuvastatin (CRESTOR) 20 mg, Oral, Nightly    senna-docusate 8.6-50 mg (PERICOLACE) 8.6-50 mg per tablet 1 tablet, Oral, Daily PRN    traMADoL (ULTRAM) 50 mg, Oral, 2 times daily PRN    traZODone (DESYREL) 50 mg, Oral, Nightly PRN         Objective:      Physical Exam:  General-  Patient alert and oriented x3 in NAD  Speech - nml  HEENT- EOMI  Resp- No increased WOB noted. Not using accessory muscles.  Skin-  No Jaundice. No visible skin lesions.        Assessment/Plan:     Problem List Items Addressed This Visit       KEVEN on CPAP    Excessive daytime sleepiness - Primary    Patient is benefiting from PAP therapy; Encouraged continued use of PAP. Drowsy driving may still occur despite PAP use. Clinical follow up and replacement of supplies discussed.    Continue the Adderall 20 mg BID     FU in 3 months - virtula for excessive daytime sleepiness and med refill           Gigi Cummins, MSN, APRN, AGACNP-BC

## 2024-10-07 DIAGNOSIS — G47.19 EXCESSIVE DAYTIME SLEEPINESS: ICD-10-CM

## 2024-10-07 DIAGNOSIS — G47.33 OBSTRUCTIVE SLEEP APNEA (ADULT) (PEDIATRIC): ICD-10-CM

## 2024-10-08 RX ORDER — DEXTROAMPHETAMINE SACCHARATE, AMPHETAMINE ASPARTATE, DEXTROAMPHETAMINE SULFATE AND AMPHETAMINE SULFATE 5; 5; 5; 5 MG/1; MG/1; MG/1; MG/1
1 TABLET ORAL 2 TIMES DAILY
Qty: 180 TABLET | Refills: 0 | Status: SHIPPED | OUTPATIENT
Start: 2024-10-08 | End: 2025-01-06

## 2024-11-05 ENCOUNTER — OFFICE VISIT (OUTPATIENT)
Dept: NEUROLOGY | Facility: CLINIC | Age: 64
End: 2024-11-05
Payer: MEDICARE

## 2024-11-05 VITALS
BODY MASS INDEX: 32.78 KG/M2 | DIASTOLIC BLOOD PRESSURE: 88 MMHG | HEIGHT: 63 IN | WEIGHT: 185 LBS | SYSTOLIC BLOOD PRESSURE: 124 MMHG

## 2024-11-05 DIAGNOSIS — G47.33 OSA ON CPAP: ICD-10-CM

## 2024-11-05 DIAGNOSIS — G47.19 EXCESSIVE DAYTIME SLEEPINESS: Primary | ICD-10-CM

## 2024-11-05 PROCEDURE — 3008F BODY MASS INDEX DOCD: CPT | Mod: CPTII,S$GLB,, | Performed by: NURSE PRACTITIONER

## 2024-11-05 PROCEDURE — 1159F MED LIST DOCD IN RCRD: CPT | Mod: CPTII,S$GLB,, | Performed by: NURSE PRACTITIONER

## 2024-11-05 PROCEDURE — 99213 OFFICE O/P EST LOW 20 MIN: CPT | Mod: S$GLB,,, | Performed by: NURSE PRACTITIONER

## 2024-11-05 PROCEDURE — 3074F SYST BP LT 130 MM HG: CPT | Mod: CPTII,S$GLB,, | Performed by: NURSE PRACTITIONER

## 2024-11-05 PROCEDURE — 3079F DIAST BP 80-89 MM HG: CPT | Mod: CPTII,S$GLB,, | Performed by: NURSE PRACTITIONER

## 2024-11-05 PROCEDURE — 99999 PR PBB SHADOW E&M-EST. PATIENT-LVL III: CPT | Mod: PBBFAC,,, | Performed by: NURSE PRACTITIONER

## 2024-11-05 PROCEDURE — 1160F RVW MEDS BY RX/DR IN RCRD: CPT | Mod: CPTII,S$GLB,, | Performed by: NURSE PRACTITIONER

## 2024-11-05 RX ORDER — CITALOPRAM 40 MG/1
40 TABLET, FILM COATED ORAL
COMMUNITY
Start: 2024-10-28

## 2024-11-05 NOTE — PROGRESS NOTES
"  Neurology Note - Sleep follow up    Subjective:         Patient ID: Teri Beck is a 64 y.o. female.    Chief Complaint: F/U KEVEN-excessive daytime sleepiness - med refill    HPI:            Wears CPAP qhs and is tolerating it well. Sleeps better w CPAP. Sleeps 7 hrs a night and sleeps all nigh    Does feel some degree of excessive daytime sleepiness; "no motivation"    Does not nap.     Adderall 20 mg BID does help reduce the severity of excessive daytime sleepiness    Denies drowsy driving    Compliance 10/06/2024-11/04/2024    AHI- 1.6     >  4 hrs 93%  CPAP 14 cm    ROS: as per HPI, otherwise pertinent systems review is negative              11/5/2024    10:30 AM   EPWORTH SLEEPINESS SCALE   Sitting and reading 1   Watching TV 0   Sitting, inactive in a public place (e.g. a theatre or a meeting) 0   As a passenger in a car for an hour without a break 0   Lying down to rest in the afternoon when circumstances permit 1   Sitting and talking to someone 0   Sitting quietly after a lunch without alcohol 0        Past Medical History:   Diagnosis Date    Depression     Fibromyalgia     Gastroparesis     GERD (gastroesophageal reflux disease)     Hypercholesteremia     Hypothyroid     Insomnia     Sleep apnea, unspecified        Past Surgical History:   Procedure Laterality Date    brest reduction Bilateral     HERNIA REPAIR  06/2022    HYSTERECTOMY      ROTATOR CUFF REPAIR      tna Bilateral     WRIST SURGERY Left        Family History   Problem Relation Name Age of Onset    Dementia Mother      Stroke Mother      Cancer Mother      Dementia Father      Diabetes Father      Liver disease Father      Diabetes Sister      Diabetes Brother         Social History     Socioeconomic History    Marital status:    Tobacco Use    Smoking status: Never    Smokeless tobacco: Never   Substance and Sexual Activity    Alcohol use: Not Currently    Drug use: Never       Review of patient's allergies indicates:  No Known " "Allergies    Current Outpatient Medications   Medication Instructions    citalopram (CELEXA) 40 mg    dextroamphetamine-amphetamine (ADDERALL) 20 mg tablet 1 tablet, Oral, 2 times daily    docusate sodium (COLACE) 100 mg, 2 times daily PRN    ergocalciferol, vitamin D2, (VITAMIN D ORAL) Take by mouth.    famotidine (PEPCID) 20 mg, Daily PRN    fexofenadine (ALLEGRA) 180 mg, Daily PRN    levothyroxine (SYNTHROID) 75 mcg, Daily    metoclopramide HCl (REGLAN) 5 mg, 3 times daily PRN    nystatin (MYCOSTATIN) powder Topical (Top), Daily    phenylephrine (SUDAFED PE) 10 mg, Every 4 hours PRN    rosuvastatin (CRESTOR) 20 mg, Nightly    senna-docusate 8.6-50 mg (PERICOLACE) 8.6-50 mg per tablet 1 tablet, Daily PRN    traMADoL (ULTRAM) 50 mg, 2 times daily PRN    traZODone (DESYREL) 50 mg, Nightly PRN         Objective:      Exam:   /88 (BP Location: Left arm, Patient Position: Sitting)   Ht 5' 3" (1.6 m)   Wt 83.9 kg (185 lb)   BMI 32.77 kg/m²     Physical Exam  Vitals reviewed.   Constitutional:       Appearance: Normal appearance. PAP mask marks on her face      Accompanied by: alone   HENT:      Ears:      Comments: Hearing normal.  Eyes:      Extraocular Movements: Extraocular movements intact.      VF's ok  Cardiovascular:      Rate and Rhythm: Normal rate and regular rhythm.   Pulmonary:      Effort: Pulmonary effort is normal.      Breath sounds: Normal breath sounds.   Musculoskeletal:         General: Normal range of motion.   Skin:     General: Skin is warm and dry.   Neurological:      General: No focal deficit present.      Mental Status: alert and oriented to person, place, and time.      Speech: nml     Face: symmetric; tongue midline     Motor: nonlateralizing     Gait: unassisted and normal.  Psychiatric:         Mood and Affect: Mood normal.         Behavior: Behavior normal.         Assessment/Plan:     Problem List Items Addressed This Visit       KEVEN on CPAP    Excessive daytime sleepiness - " Primary    Continue PAP; PAP remains beneficial     Offered to pivot with the stimulant - considered Provigil; she was averse.    Her mental health is likely playing more of a role in her fatigue given current life stressors.  [Sick brother]    Follow up 3 months for keyanna Cummins, MSN, APRN, AGACNP-BC

## 2024-12-10 ENCOUNTER — LAB VISIT (OUTPATIENT)
Dept: LAB | Facility: HOSPITAL | Age: 64
End: 2024-12-10
Attending: INTERNAL MEDICINE
Payer: MEDICARE

## 2024-12-10 DIAGNOSIS — E78.00 PURE HYPERCHOLESTEROLEMIA: ICD-10-CM

## 2024-12-10 DIAGNOSIS — E03.9 MYXEDEMA HEART DISEASE: ICD-10-CM

## 2024-12-10 DIAGNOSIS — E55.9 AVITAMINOSIS D: Primary | ICD-10-CM

## 2024-12-10 DIAGNOSIS — I10 ESSENTIAL HYPERTENSION, MALIGNANT: ICD-10-CM

## 2024-12-10 DIAGNOSIS — I51.9 MYXEDEMA HEART DISEASE: ICD-10-CM

## 2024-12-10 LAB
25(OH)D3+25(OH)D2 SERPL-MCNC: 33 NG/ML (ref 30–80)
ALBUMIN SERPL-MCNC: 3.4 G/DL (ref 3.4–4.8)
ALBUMIN/GLOB SERPL: 0.9 RATIO (ref 1.1–2)
ALP SERPL-CCNC: 89 UNIT/L (ref 40–150)
ALT SERPL-CCNC: 8 UNIT/L (ref 0–55)
ANION GAP SERPL CALC-SCNC: 7 MEQ/L
AST SERPL-CCNC: 16 UNIT/L (ref 5–34)
BASOPHILS # BLD AUTO: 0.03 X10(3)/MCL
BASOPHILS NFR BLD AUTO: 0.6 %
BILIRUB SERPL-MCNC: 0.5 MG/DL
BUN SERPL-MCNC: 13.1 MG/DL (ref 9.8–20.1)
CALCIUM SERPL-MCNC: 9.4 MG/DL (ref 8.4–10.2)
CHLORIDE SERPL-SCNC: 107 MMOL/L (ref 98–107)
CHOLEST SERPL-MCNC: 153 MG/DL
CHOLEST/HDLC SERPL: 3 {RATIO} (ref 0–5)
CO2 SERPL-SCNC: 29 MMOL/L (ref 23–31)
CREAT SERPL-MCNC: 0.96 MG/DL (ref 0.55–1.02)
CREAT/UREA NIT SERPL: 14
EOSINOPHIL # BLD AUTO: 0.13 X10(3)/MCL (ref 0–0.9)
EOSINOPHIL NFR BLD AUTO: 2.5 %
ERYTHROCYTE [DISTWIDTH] IN BLOOD BY AUTOMATED COUNT: 12.5 % (ref 11.5–17)
GFR SERPLBLD CREATININE-BSD FMLA CKD-EPI: >60 ML/MIN/1.73/M2
GLOBULIN SER-MCNC: 3.8 GM/DL (ref 2.4–3.5)
GLUCOSE SERPL-MCNC: 91 MG/DL (ref 82–115)
HCT VFR BLD AUTO: 42.1 % (ref 37–47)
HDLC SERPL-MCNC: 58 MG/DL (ref 35–60)
HGB BLD-MCNC: 13.8 G/DL (ref 12–16)
IMM GRANULOCYTES # BLD AUTO: 0 X10(3)/MCL (ref 0–0.04)
IMM GRANULOCYTES NFR BLD AUTO: 0 %
LDLC SERPL CALC-MCNC: 84 MG/DL (ref 50–140)
LYMPHOCYTES # BLD AUTO: 1.38 X10(3)/MCL (ref 0.6–4.6)
LYMPHOCYTES NFR BLD AUTO: 26.3 %
MCH RBC QN AUTO: 32.1 PG (ref 27–31)
MCHC RBC AUTO-ENTMCNC: 32.8 G/DL (ref 33–36)
MCV RBC AUTO: 97.9 FL (ref 80–94)
MONOCYTES # BLD AUTO: 0.45 X10(3)/MCL (ref 0.1–1.3)
MONOCYTES NFR BLD AUTO: 8.6 %
NEUTROPHILS # BLD AUTO: 3.26 X10(3)/MCL (ref 2.1–9.2)
NEUTROPHILS NFR BLD AUTO: 62 %
PLATELET # BLD AUTO: 269 X10(3)/MCL (ref 130–400)
PMV BLD AUTO: 9.3 FL (ref 7.4–10.4)
POTASSIUM SERPL-SCNC: 4.2 MMOL/L (ref 3.5–5.1)
PROT SERPL-MCNC: 7.2 GM/DL (ref 5.8–7.6)
RBC # BLD AUTO: 4.3 X10(6)/MCL (ref 4.2–5.4)
SODIUM SERPL-SCNC: 143 MMOL/L (ref 136–145)
TRIGL SERPL-MCNC: 57 MG/DL (ref 37–140)
TSH SERPL-ACNC: 1.05 UIU/ML (ref 0.35–4.94)
VLDLC SERPL CALC-MCNC: 11 MG/DL
WBC # BLD AUTO: 5.25 X10(3)/MCL (ref 4.5–11.5)

## 2024-12-10 PROCEDURE — 84443 ASSAY THYROID STIM HORMONE: CPT

## 2024-12-10 PROCEDURE — 85025 COMPLETE CBC W/AUTO DIFF WBC: CPT

## 2024-12-10 PROCEDURE — 80053 COMPREHEN METABOLIC PANEL: CPT

## 2024-12-10 PROCEDURE — 82306 VITAMIN D 25 HYDROXY: CPT

## 2024-12-10 PROCEDURE — 80061 LIPID PANEL: CPT

## 2024-12-10 PROCEDURE — 36415 COLL VENOUS BLD VENIPUNCTURE: CPT

## 2025-01-15 DIAGNOSIS — G47.19 EXCESSIVE DAYTIME SLEEPINESS: ICD-10-CM

## 2025-01-15 DIAGNOSIS — G47.33 OBSTRUCTIVE SLEEP APNEA (ADULT) (PEDIATRIC): ICD-10-CM

## 2025-01-16 RX ORDER — DEXTROAMPHETAMINE SACCHARATE, AMPHETAMINE ASPARTATE, DEXTROAMPHETAMINE SULFATE AND AMPHETAMINE SULFATE 5; 5; 5; 5 MG/1; MG/1; MG/1; MG/1
1 TABLET ORAL 2 TIMES DAILY
Qty: 180 TABLET | Refills: 0 | Status: SHIPPED | OUTPATIENT
Start: 2025-01-16 | End: 2025-04-16

## 2025-02-17 ENCOUNTER — OFFICE VISIT (OUTPATIENT)
Dept: NEUROLOGY | Facility: CLINIC | Age: 65
End: 2025-02-17
Payer: MEDICARE

## 2025-02-17 VITALS
HEIGHT: 63 IN | DIASTOLIC BLOOD PRESSURE: 108 MMHG | WEIGHT: 185 LBS | BODY MASS INDEX: 32.78 KG/M2 | SYSTOLIC BLOOD PRESSURE: 164 MMHG

## 2025-02-17 DIAGNOSIS — G47.33 OSA ON CPAP: ICD-10-CM

## 2025-02-17 DIAGNOSIS — G47.19 EXCESSIVE DAYTIME SLEEPINESS: Primary | ICD-10-CM

## 2025-02-17 NOTE — PROGRESS NOTES
Neurology Note - Sleep follow up    Subjective:         Patient ID: Teri Beck is a 64 y.o. female.    Chief Complaint: excessive daytime sleepiness; med refill     HPI:            Wears CPAP qhs and is tolerating it well. Sleeps better w CPAP. Sleeps 7 hrs a night and occs awakens for no reason and is able to go right back to sleep. Awakens un refreshed and has EDS. Does not nap. Denies any issues w mask or equipment.     Does report pain daily and at bed time for Fibromyalgia. Tried Duloxetine in the past but her depression got worsened.    She does take the Adderall 20 mg every morning - does help reduce the severity of excessive daytime sleepiness but it is not a homerun. She finds herself getting so drowsy during the day that she cannot complete household chores.     PAP data:  date range 01/18/2025-02/16/2025  days used >=4hr 29/30  97%  AHI 1.2  CPAP 14 cm           2/17/2025   EPWORTH SLEEPINESS SCALE   Sitting and reading 2   Watching TV 1   Sitting, inactive in a public place (e.g. a theatre or a meeting) 0   As a passenger in a car for an hour without a break 0   Lying down to rest in the afternoon when circumstances permit 3   Sitting and talking to someone 0   Sitting quietly after a lunch without alcohol 0   In a car, while stopped for a few minutes in traffic 0   Total score 6       ROS: as per HPI, otherwise pertinent systems review is negative           Past Medical History:   Diagnosis Date    Depression     Fibromyalgia     Gastroparesis     GERD (gastroesophageal reflux disease)     Hypercholesteremia     Hypothyroid     Insomnia     Sleep apnea, unspecified        Past Surgical History:   Procedure Laterality Date    brest reduction Bilateral     HERNIA REPAIR  06/2022    HYSTERECTOMY      ROTATOR CUFF REPAIR      tna Bilateral     WRIST SURGERY Left        Family History   Problem Relation Name Age of Onset    Dementia Mother      Stroke Mother      Cancer Mother      Dementia Father       "Diabetes Father      Liver disease Father      Diabetes Sister      Diabetes Brother         Social History     Socioeconomic History    Marital status:    Tobacco Use    Smoking status: Never    Smokeless tobacco: Never   Substance and Sexual Activity    Alcohol use: Not Currently    Drug use: Never       Review of patient's allergies indicates:  No Known Allergies    Current Medications[1]     Objective:      Exam:   BP (!) 164/108 (BP Location: Left arm, Patient Position: Sitting)   Ht 5' 3" (1.6 m)   Wt 83.9 kg (185 lb)   BMI 32.77 kg/m²     Physical Exam  Vitals reviewed.   Constitutional:       Appearance: Normal appearance.      Accompanied by:  HENT:      Ears:      Comments: Hearing normal.  Eyes:      Extraocular Movements: Extraocular movements intact.      VF's ok  Cardiovascular:      Rate and Rhythm: Normal rate and regular rhythm.   Pulmonary:      Effort: Pulmonary effort is normal.      Breath sounds: Normal breath sounds.   Musculoskeletal:         General: Normal range of motion.   Skin:     General: Skin is warm and dry.   Neurological:      General: No focal deficit present.      Mental Status: alert and oriented to person, place, and time.      Speech: nml     Face: symmetric; tongue midline     Motor: nonlateralizing     Gait: unassisted and normal.  Psychiatric:         Mood and Affect: Mood normal.         Behavior: Behavior normal.         Assessment/Plan:     Problem List Items Addressed This Visit       KEVEN on CPAP    Excessive daytime sleepiness - Primary    Patient is benefiting from PAP therapy; Encouraged continued use of PAP. Drowsy driving may still occur despite PAP use. Clinical follow up and replacement of supplies discussed.    She has tried the Adderall and Ritalin and neither have been a homerun. She finds herself getting so drowsy during the day that she cannot complete household chores.     Stop the Adderall and start Sunosi 75 mg tab, 0.5 tab every morning for 3 " days then 1 tab every morning thereafter. Medication administration personally reviewed with patient by MD/provider. Potential or actual medication changes discussed. Common and potentially serious side effects of medications or medication changes discussed.    Repeat BP was: 172/102 - she feels fine and denies symptoms of HTN. I request she monitor BP and report today's reading to Dr. Gunn PCP for further recs. Adderall may play a role - so more reason to stop taking it and try low dose Sunosi.       FU in 3 months              Gigi Cummins, MSN, APRN, AGACNP-BC             [1]   Current Outpatient Medications:     citalopram (CELEXA) 40 MG tablet, Take 40 mg by mouth., Disp: , Rfl:     dextroamphetamine-amphetamine (ADDERALL) 20 mg tablet, Take 1 tablet by mouth 2 (two) times daily., Disp: 180 tablet, Rfl: 0    docusate sodium (COLACE) 100 MG capsule, Take 100 mg by mouth 2 (two) times daily as needed., Disp: , Rfl:     ergocalciferol, vitamin D2, (VITAMIN D ORAL), Take by mouth., Disp: , Rfl:     famotidine (PEPCID) 20 MG tablet, Take 20 mg by mouth daily as needed., Disp: , Rfl:     fexofenadine (ALLEGRA) 180 MG tablet, Take 180 mg by mouth daily as needed., Disp: , Rfl:     levothyroxine (SYNTHROID) 75 MCG tablet, Take 75 mcg by mouth once daily., Disp: , Rfl:     metoclopramide HCl (REGLAN) 5 MG tablet, Take 5 mg by mouth 3 (three) times daily as needed., Disp: , Rfl:     phenylephrine (SUDAFED PE) 10 MG Tab, Take 10 mg by mouth every 4 (four) hours as needed., Disp: , Rfl:     rosuvastatin (CRESTOR) 20 MG tablet, Take 20 mg by mouth nightly., Disp: , Rfl:     senna-docusate 8.6-50 mg (PERICOLACE) 8.6-50 mg per tablet, Take 1 tablet by mouth daily as needed., Disp: , Rfl:     traMADoL (ULTRAM) 50 mg tablet, Take 50 mg by mouth 2 (two) times daily as needed., Disp: , Rfl:     traZODone (DESYREL) 50 MG tablet, Take 50 mg by mouth nightly as needed., Disp: , Rfl:

## 2025-02-18 ENCOUNTER — TELEPHONE (OUTPATIENT)
Dept: NEUROLOGY | Facility: CLINIC | Age: 65
End: 2025-02-18

## 2025-04-24 ENCOUNTER — TELEPHONE (OUTPATIENT)
Dept: NEUROLOGY | Facility: CLINIC | Age: 65
End: 2025-04-24
Payer: MEDICARE

## 2025-04-24 DIAGNOSIS — G47.19 EXCESSIVE DAYTIME SLEEPINESS: ICD-10-CM

## 2025-04-24 DIAGNOSIS — G47.33 OBSTRUCTIVE SLEEP APNEA (ADULT) (PEDIATRIC): ICD-10-CM

## 2025-04-24 RX ORDER — DEXTROAMPHETAMINE SACCHARATE, AMPHETAMINE ASPARTATE, DEXTROAMPHETAMINE SULFATE AND AMPHETAMINE SULFATE 5; 5; 5; 5 MG/1; MG/1; MG/1; MG/1
1 TABLET ORAL 2 TIMES DAILY
Qty: 180 TABLET | Refills: 0 | Status: SHIPPED | OUTPATIENT
Start: 2025-04-24 | End: 2025-07-23

## 2025-05-19 ENCOUNTER — OFFICE VISIT (OUTPATIENT)
Dept: NEUROLOGY | Facility: CLINIC | Age: 65
End: 2025-05-19
Payer: MEDICARE

## 2025-05-19 VITALS
SYSTOLIC BLOOD PRESSURE: 155 MMHG | DIASTOLIC BLOOD PRESSURE: 90 MMHG | HEIGHT: 64 IN | WEIGHT: 185 LBS | BODY MASS INDEX: 31.58 KG/M2

## 2025-05-19 DIAGNOSIS — G47.19 EXCESSIVE DAYTIME SLEEPINESS: Primary | ICD-10-CM

## 2025-05-19 DIAGNOSIS — G47.33 OSA ON CPAP: ICD-10-CM

## 2025-05-19 PROCEDURE — 3080F DIAST BP >= 90 MM HG: CPT | Mod: CPTII,S$GLB,, | Performed by: NURSE PRACTITIONER

## 2025-05-19 PROCEDURE — 3008F BODY MASS INDEX DOCD: CPT | Mod: CPTII,S$GLB,, | Performed by: NURSE PRACTITIONER

## 2025-05-19 PROCEDURE — 1101F PT FALLS ASSESS-DOCD LE1/YR: CPT | Mod: CPTII,S$GLB,, | Performed by: NURSE PRACTITIONER

## 2025-05-19 PROCEDURE — 99999 PR PBB SHADOW E&M-EST. PATIENT-LVL III: CPT | Mod: PBBFAC,,, | Performed by: NURSE PRACTITIONER

## 2025-05-19 PROCEDURE — 3288F FALL RISK ASSESSMENT DOCD: CPT | Mod: CPTII,S$GLB,, | Performed by: NURSE PRACTITIONER

## 2025-05-19 PROCEDURE — 3077F SYST BP >= 140 MM HG: CPT | Mod: CPTII,S$GLB,, | Performed by: NURSE PRACTITIONER

## 2025-05-19 PROCEDURE — 99214 OFFICE O/P EST MOD 30 MIN: CPT | Mod: S$GLB,,, | Performed by: NURSE PRACTITIONER

## 2025-05-19 PROCEDURE — 1160F RVW MEDS BY RX/DR IN RCRD: CPT | Mod: CPTII,S$GLB,, | Performed by: NURSE PRACTITIONER

## 2025-05-19 PROCEDURE — 1159F MED LIST DOCD IN RCRD: CPT | Mod: CPTII,S$GLB,, | Performed by: NURSE PRACTITIONER

## 2025-05-19 NOTE — PROGRESS NOTES
Neurology Note - Sleep follow up    Subjective:         Patient ID: Teri Beck is a 65 y.o. female.    Chief Complaint: KEVEN/excessive daytime sleepiness/med refill    HPI:            Wears CPAP qhs. Tolerating it well. PAP therapy is beneficial. Does not feel as well refreshed upon awakening. Somewhat has EDS. No diff w staying asleep.     Diff w falling asleep - the Trazodone, when she takes it, helps. Sleeps abt 6-7 hrs/night.     No issues w mask or machine.     DME- [facility]    PAP Compliance  Date (4/14/2025-5/13/2025)  Usage days >=4 hrs (93%)  AHI: (1.5)    CPAP 14 cm    The Adderall 20 mg BID remains helpful - the Sunosi was cost prohibitive    BP has been approx 150/90        5/19/2025   EPWORTH SLEEPINESS SCALE   Sitting and reading 1   Watching TV 0   Sitting, inactive in a public place (e.g. a theatre or a meeting) 0   As a passenger in a car for an hour without a break 0   Lying down to rest in the afternoon when circumstances permit 2   Sitting and talking to someone 0   Sitting quietly after a lunch without alcohol 0   In a car, while stopped for a few minutes in traffic 0   Total score 3       ROS: as per HPI, otherwise pertinent systems review is negative            Past Medical History:   Diagnosis Date    Depression     Fibromyalgia     Gastroparesis     GERD (gastroesophageal reflux disease)     Hypercholesteremia     Hypothyroid     Insomnia     Sleep apnea, unspecified        Past Surgical History:   Procedure Laterality Date    brest reduction Bilateral     HERNIA REPAIR  06/2022    HYSTERECTOMY      ROTATOR CUFF REPAIR      tna Bilateral     WRIST SURGERY Left        Family History   Problem Relation Name Age of Onset    Dementia Mother      Stroke Mother      Cancer Mother      Dementia Father      Diabetes Father      Liver disease Father      Diabetes Sister      Diabetes Brother         Social History     Socioeconomic History    Marital status:    Tobacco Use    Smoking status:  "Never    Smokeless tobacco: Never   Substance and Sexual Activity    Alcohol use: Not Currently    Drug use: Never       Review of patient's allergies indicates:  No Known Allergies    Current Outpatient Medications   Medication Instructions    citalopram (CELEXA) 40 mg    dextroamphetamine-amphetamine (ADDERALL) 20 mg tablet 1 tablet, Oral, 2 times daily    docusate sodium (COLACE) 100 mg, 2 times daily PRN    ergocalciferol, vitamin D2, (VITAMIN D ORAL) Take by mouth.    famotidine (PEPCID) 20 mg, Daily PRN    fexofenadine (ALLEGRA) 180 mg, Daily PRN    levothyroxine (SYNTHROID) 75 mcg, Daily    metoclopramide HCl (REGLAN) 5 mg, 3 times daily PRN    phenylephrine (SUDAFED PE) 10 mg, Every 4 hours PRN    rosuvastatin (CRESTOR) 20 mg, Nightly    senna-docusate 8.6-50 mg (PERICOLACE) 8.6-50 mg per tablet 1 tablet, Daily PRN    traMADoL (ULTRAM) 50 mg, 2 times daily PRN    traZODone (DESYREL) 50 mg, Nightly PRN         Objective:      Exam:   BP (!) 155/90 (BP Location: Left arm, Patient Position: Sitting)   Ht 5' 4" (1.626 m)   Wt 83.9 kg (185 lb)   BMI 31.76 kg/m²     Physical Exam  Vitals reviewed.   Constitutional:       Appearance: Normal appearance.      Accompanied by: alone   Cardiovascular:      Rate and Rhythm: Normal rate and regular rhythm.   Pulmonary:      Effort: Pulmonary effort is normal.      Breath sounds: Normal breath sounds.   Musculoskeletal:         General: Normal range of motion.   Skin:     General: Skin is warm and dry.   Neurological:      General: No focal deficit present.      Mental Status: alert and oriented      Speech: nml     Face: symmetric; tongue midline     Motor: nonlateralizing     Gait: unassisted and normal.  Psychiatric:         Mood and Affect: Mood normal.         Behavior: Behavior normal.         Assessment/Plan:     Problem List Items Addressed This Visit       KEVEN on CPAP    Excessive daytime sleepiness - Primary    Patient is benefiting from PAP therapy; " Encouraged continued use of PAP. Drowsy driving may still occur despite PAP use. Clinical follow up and replacement of supplies discussed.    Continue the Adderall    Continue to monitor the BP    DME:[facility] in 3 months            Gigi Cummins MSN, APRN, AGACNP-BC

## 2025-08-04 ENCOUNTER — PATIENT MESSAGE (OUTPATIENT)
Dept: NEUROLOGY | Facility: CLINIC | Age: 65
End: 2025-08-04
Payer: MEDICARE

## 2025-08-07 DIAGNOSIS — G47.33 OBSTRUCTIVE SLEEP APNEA (ADULT) (PEDIATRIC): ICD-10-CM

## 2025-08-07 DIAGNOSIS — G47.19 EXCESSIVE DAYTIME SLEEPINESS: ICD-10-CM

## 2025-08-07 RX ORDER — DEXTROAMPHETAMINE SACCHARATE, AMPHETAMINE ASPARTATE, DEXTROAMPHETAMINE SULFATE AND AMPHETAMINE SULFATE 5; 5; 5; 5 MG/1; MG/1; MG/1; MG/1
1 TABLET ORAL 2 TIMES DAILY
Qty: 180 TABLET | Refills: 0 | Status: SHIPPED | OUTPATIENT
Start: 2025-08-07 | End: 2025-11-05

## 2025-08-18 ENCOUNTER — OFFICE VISIT (OUTPATIENT)
Facility: CLINIC | Age: 65
End: 2025-08-18
Payer: MEDICARE

## 2025-08-18 VITALS
SYSTOLIC BLOOD PRESSURE: 146 MMHG | DIASTOLIC BLOOD PRESSURE: 92 MMHG | WEIGHT: 181 LBS | HEIGHT: 64 IN | BODY MASS INDEX: 30.9 KG/M2

## 2025-08-18 DIAGNOSIS — G47.19 EXCESSIVE DAYTIME SLEEPINESS: Primary | ICD-10-CM

## 2025-08-18 DIAGNOSIS — G47.33 OSA (OBSTRUCTIVE SLEEP APNEA): ICD-10-CM

## 2025-08-18 PROCEDURE — 1101F PT FALLS ASSESS-DOCD LE1/YR: CPT | Mod: CPTII,S$GLB,, | Performed by: NURSE PRACTITIONER

## 2025-08-18 PROCEDURE — 1160F RVW MEDS BY RX/DR IN RCRD: CPT | Mod: CPTII,S$GLB,, | Performed by: NURSE PRACTITIONER

## 2025-08-18 PROCEDURE — 3077F SYST BP >= 140 MM HG: CPT | Mod: CPTII,S$GLB,, | Performed by: NURSE PRACTITIONER

## 2025-08-18 PROCEDURE — 3008F BODY MASS INDEX DOCD: CPT | Mod: CPTII,S$GLB,, | Performed by: NURSE PRACTITIONER

## 2025-08-18 PROCEDURE — 99214 OFFICE O/P EST MOD 30 MIN: CPT | Mod: S$GLB,,, | Performed by: NURSE PRACTITIONER

## 2025-08-18 PROCEDURE — 99999 PR PBB SHADOW E&M-EST. PATIENT-LVL III: CPT | Mod: PBBFAC,,, | Performed by: NURSE PRACTITIONER

## 2025-08-18 PROCEDURE — 3080F DIAST BP >= 90 MM HG: CPT | Mod: CPTII,S$GLB,, | Performed by: NURSE PRACTITIONER

## 2025-08-18 PROCEDURE — 3288F FALL RISK ASSESSMENT DOCD: CPT | Mod: CPTII,S$GLB,, | Performed by: NURSE PRACTITIONER

## 2025-08-18 PROCEDURE — 1159F MED LIST DOCD IN RCRD: CPT | Mod: CPTII,S$GLB,, | Performed by: NURSE PRACTITIONER
